# Patient Record
Sex: MALE | Race: OTHER | HISPANIC OR LATINO | URBAN - METROPOLITAN AREA
[De-identification: names, ages, dates, MRNs, and addresses within clinical notes are randomized per-mention and may not be internally consistent; named-entity substitution may affect disease eponyms.]

---

## 2019-08-29 ENCOUNTER — INPATIENT (INPATIENT)
Facility: HOSPITAL | Age: 74
LOS: 1 days | Discharge: HOME | DRG: 247 | End: 2019-08-31
Attending: FAMILY MEDICINE | Admitting: FAMILY MEDICINE
Payer: MEDICARE

## 2019-08-29 VITALS
DIASTOLIC BLOOD PRESSURE: 69 MMHG | RESPIRATION RATE: 20 BRPM | HEIGHT: 66 IN | WEIGHT: 160.06 LBS | SYSTOLIC BLOOD PRESSURE: 183 MMHG | TEMPERATURE: 98 F | HEART RATE: 67 BPM

## 2019-08-29 DIAGNOSIS — I21.4 NON-ST ELEVATION (NSTEMI) MYOCARDIAL INFARCTION: ICD-10-CM

## 2019-08-29 LAB
ALBUMIN SERPL ELPH-MCNC: 4 G/DL — SIGNIFICANT CHANGE UP (ref 3.3–5.2)
ALP SERPL-CCNC: 106 U/L — SIGNIFICANT CHANGE UP (ref 40–120)
ALT FLD-CCNC: 28 U/L — SIGNIFICANT CHANGE UP
ANION GAP SERPL CALC-SCNC: 15 MMOL/L — SIGNIFICANT CHANGE UP (ref 5–17)
APTT BLD: 30.7 SEC — SIGNIFICANT CHANGE UP (ref 27.5–36.3)
AST SERPL-CCNC: 38 U/L — SIGNIFICANT CHANGE UP
BASOPHILS # BLD AUTO: 0.07 K/UL — SIGNIFICANT CHANGE UP (ref 0–0.2)
BASOPHILS NFR BLD AUTO: 0.8 % — SIGNIFICANT CHANGE UP (ref 0–2)
BILIRUB SERPL-MCNC: 0.4 MG/DL — SIGNIFICANT CHANGE UP (ref 0.4–2)
BUN SERPL-MCNC: 19 MG/DL — SIGNIFICANT CHANGE UP (ref 8–20)
CALCIUM SERPL-MCNC: 9.5 MG/DL — SIGNIFICANT CHANGE UP (ref 8.6–10.2)
CHLORIDE SERPL-SCNC: 102 MMOL/L — SIGNIFICANT CHANGE UP (ref 98–107)
CO2 SERPL-SCNC: 23 MMOL/L — SIGNIFICANT CHANGE UP (ref 22–29)
CREAT SERPL-MCNC: 1.01 MG/DL — SIGNIFICANT CHANGE UP (ref 0.5–1.3)
EOSINOPHIL # BLD AUTO: 0.15 K/UL — SIGNIFICANT CHANGE UP (ref 0–0.5)
EOSINOPHIL NFR BLD AUTO: 1.8 % — SIGNIFICANT CHANGE UP (ref 0–6)
GLUCOSE BLDC GLUCOMTR-MCNC: 127 MG/DL — HIGH (ref 70–99)
GLUCOSE SERPL-MCNC: 154 MG/DL — HIGH (ref 70–115)
HCT VFR BLD CALC: 42.7 % — SIGNIFICANT CHANGE UP (ref 39–50)
HGB BLD-MCNC: 14.4 G/DL — SIGNIFICANT CHANGE UP (ref 13–17)
IMM GRANULOCYTES NFR BLD AUTO: 0.2 % — SIGNIFICANT CHANGE UP (ref 0–1.5)
INR BLD: 1 RATIO — SIGNIFICANT CHANGE UP (ref 0.88–1.16)
LYMPHOCYTES # BLD AUTO: 0.96 K/UL — LOW (ref 1–3.3)
LYMPHOCYTES # BLD AUTO: 11.3 % — LOW (ref 13–44)
MAGNESIUM SERPL-MCNC: 2.1 MG/DL — SIGNIFICANT CHANGE UP (ref 1.6–2.6)
MCHC RBC-ENTMCNC: 29.6 PG — SIGNIFICANT CHANGE UP (ref 27–34)
MCHC RBC-ENTMCNC: 33.7 GM/DL — SIGNIFICANT CHANGE UP (ref 32–36)
MCV RBC AUTO: 87.7 FL — SIGNIFICANT CHANGE UP (ref 80–100)
MONOCYTES # BLD AUTO: 0.79 K/UL — SIGNIFICANT CHANGE UP (ref 0–0.9)
MONOCYTES NFR BLD AUTO: 9.3 % — SIGNIFICANT CHANGE UP (ref 2–14)
NEUTROPHILS # BLD AUTO: 6.49 K/UL — SIGNIFICANT CHANGE UP (ref 1.8–7.4)
NEUTROPHILS NFR BLD AUTO: 76.6 % — SIGNIFICANT CHANGE UP (ref 43–77)
NT-PROBNP SERPL-SCNC: 166 PG/ML — SIGNIFICANT CHANGE UP (ref 0–300)
PLATELET # BLD AUTO: 225 K/UL — SIGNIFICANT CHANGE UP (ref 150–400)
POTASSIUM SERPL-MCNC: 3.6 MMOL/L — SIGNIFICANT CHANGE UP (ref 3.5–5.3)
POTASSIUM SERPL-SCNC: 3.6 MMOL/L — SIGNIFICANT CHANGE UP (ref 3.5–5.3)
PROT SERPL-MCNC: 7.4 G/DL — SIGNIFICANT CHANGE UP (ref 6.6–8.7)
PROTHROM AB SERPL-ACNC: 11.5 SEC — SIGNIFICANT CHANGE UP (ref 10–12.9)
RBC # BLD: 4.87 M/UL — SIGNIFICANT CHANGE UP (ref 4.2–5.8)
RBC # FLD: 13.7 % — SIGNIFICANT CHANGE UP (ref 10.3–14.5)
SODIUM SERPL-SCNC: 140 MMOL/L — SIGNIFICANT CHANGE UP (ref 135–145)
TROPONIN T SERPL-MCNC: 0.32 NG/ML — HIGH (ref 0–0.06)
WBC # BLD: 8.48 K/UL — SIGNIFICANT CHANGE UP (ref 3.8–10.5)
WBC # FLD AUTO: 8.48 K/UL — SIGNIFICANT CHANGE UP (ref 3.8–10.5)

## 2019-08-29 PROCEDURE — 71045 X-RAY EXAM CHEST 1 VIEW: CPT | Mod: 26

## 2019-08-29 PROCEDURE — 93010 ELECTROCARDIOGRAM REPORT: CPT | Mod: 76

## 2019-08-29 PROCEDURE — 99291 CRITICAL CARE FIRST HOUR: CPT | Mod: GC

## 2019-08-29 RX ORDER — NITROGLYCERIN 6.5 MG
0.4 CAPSULE, EXTENDED RELEASE ORAL
Refills: 0 | Status: DISCONTINUED | OUTPATIENT
Start: 2019-08-29 | End: 2019-08-31

## 2019-08-29 RX ORDER — METOPROLOL TARTRATE 50 MG
1 TABLET ORAL
Qty: 0 | Refills: 0 | DISCHARGE

## 2019-08-29 RX ORDER — DEXTROSE 50 % IN WATER 50 %
25 SYRINGE (ML) INTRAVENOUS ONCE
Refills: 0 | Status: DISCONTINUED | OUTPATIENT
Start: 2019-08-29 | End: 2019-08-31

## 2019-08-29 RX ORDER — SODIUM CHLORIDE 9 MG/ML
1000 INJECTION, SOLUTION INTRAVENOUS
Refills: 0 | Status: DISCONTINUED | OUTPATIENT
Start: 2019-08-29 | End: 2019-08-31

## 2019-08-29 RX ORDER — METOPROLOL TARTRATE 50 MG
25 TABLET ORAL EVERY 12 HOURS
Refills: 0 | Status: DISCONTINUED | OUTPATIENT
Start: 2019-08-29 | End: 2019-08-29

## 2019-08-29 RX ORDER — FAMOTIDINE 10 MG/ML
20 INJECTION INTRAVENOUS
Refills: 0 | Status: DISCONTINUED | OUTPATIENT
Start: 2019-08-29 | End: 2019-08-31

## 2019-08-29 RX ORDER — SPIRONOLACTONE 25 MG/1
25 TABLET, FILM COATED ORAL DAILY
Refills: 0 | Status: DISCONTINUED | OUTPATIENT
Start: 2019-08-29 | End: 2019-08-31

## 2019-08-29 RX ORDER — ZOLPIDEM TARTRATE 10 MG/1
5 TABLET ORAL AT BEDTIME
Refills: 0 | Status: DISCONTINUED | OUTPATIENT
Start: 2019-08-29 | End: 2019-08-31

## 2019-08-29 RX ORDER — ACETAMINOPHEN 500 MG
650 TABLET ORAL EVERY 6 HOURS
Refills: 0 | Status: DISCONTINUED | OUTPATIENT
Start: 2019-08-29 | End: 2019-08-31

## 2019-08-29 RX ORDER — ATORVASTATIN CALCIUM 80 MG/1
80 TABLET, FILM COATED ORAL AT BEDTIME
Refills: 0 | Status: DISCONTINUED | OUTPATIENT
Start: 2019-08-29 | End: 2019-08-31

## 2019-08-29 RX ORDER — ASPIRIN/CALCIUM CARB/MAGNESIUM 324 MG
325 TABLET ORAL DAILY
Refills: 0 | Status: DISCONTINUED | OUTPATIENT
Start: 2019-08-29 | End: 2019-08-30

## 2019-08-29 RX ORDER — LOSARTAN POTASSIUM 100 MG/1
25 TABLET, FILM COATED ORAL DAILY
Refills: 0 | Status: DISCONTINUED | OUTPATIENT
Start: 2019-08-29 | End: 2019-08-29

## 2019-08-29 RX ORDER — LOSARTAN POTASSIUM 100 MG/1
50 TABLET, FILM COATED ORAL DAILY
Refills: 0 | Status: DISCONTINUED | OUTPATIENT
Start: 2019-08-29 | End: 2019-08-31

## 2019-08-29 RX ORDER — MORPHINE SULFATE 50 MG/1
2 CAPSULE, EXTENDED RELEASE ORAL ONCE
Refills: 0 | Status: DISCONTINUED | OUTPATIENT
Start: 2019-08-29 | End: 2019-08-29

## 2019-08-29 RX ORDER — TICAGRELOR 90 MG/1
90 TABLET ORAL EVERY 12 HOURS
Refills: 0 | Status: DISCONTINUED | OUTPATIENT
Start: 2019-08-30 | End: 2019-08-31

## 2019-08-29 RX ORDER — DEXTROSE 50 % IN WATER 50 %
12.5 SYRINGE (ML) INTRAVENOUS ONCE
Refills: 0 | Status: DISCONTINUED | OUTPATIENT
Start: 2019-08-29 | End: 2019-08-31

## 2019-08-29 RX ORDER — METFORMIN HYDROCHLORIDE 850 MG/1
1 TABLET ORAL
Qty: 0 | Refills: 0 | DISCHARGE

## 2019-08-29 RX ORDER — RANITIDINE HYDROCHLORIDE 150 MG/1
1 TABLET, FILM COATED ORAL
Qty: 0 | Refills: 0 | DISCHARGE

## 2019-08-29 RX ORDER — POTASSIUM CHLORIDE 20 MEQ
40 PACKET (EA) ORAL ONCE
Refills: 0 | Status: COMPLETED | OUTPATIENT
Start: 2019-08-29 | End: 2019-08-29

## 2019-08-29 RX ORDER — INSULIN LISPRO 100/ML
VIAL (ML) SUBCUTANEOUS
Refills: 0 | Status: DISCONTINUED | OUTPATIENT
Start: 2019-08-29 | End: 2019-08-31

## 2019-08-29 RX ORDER — DEXTROSE 50 % IN WATER 50 %
15 SYRINGE (ML) INTRAVENOUS ONCE
Refills: 0 | Status: DISCONTINUED | OUTPATIENT
Start: 2019-08-29 | End: 2019-08-31

## 2019-08-29 RX ORDER — GLUCAGON INJECTION, SOLUTION 0.5 MG/.1ML
1 INJECTION, SOLUTION SUBCUTANEOUS ONCE
Refills: 0 | Status: DISCONTINUED | OUTPATIENT
Start: 2019-08-29 | End: 2019-08-31

## 2019-08-29 RX ORDER — NITROGLYCERIN 6.5 MG
0.5 CAPSULE, EXTENDED RELEASE ORAL EVERY 6 HOURS
Refills: 0 | Status: DISCONTINUED | OUTPATIENT
Start: 2019-08-29 | End: 2019-08-30

## 2019-08-29 RX ORDER — HYDROCHLOROTHIAZIDE 25 MG
25 TABLET ORAL DAILY
Refills: 0 | Status: DISCONTINUED | OUTPATIENT
Start: 2019-08-29 | End: 2019-08-29

## 2019-08-29 RX ORDER — METOPROLOL TARTRATE 50 MG
25 TABLET ORAL DAILY
Refills: 0 | Status: DISCONTINUED | OUTPATIENT
Start: 2019-08-29 | End: 2019-08-31

## 2019-08-29 RX ADMIN — ATORVASTATIN CALCIUM 80 MILLIGRAM(S): 80 TABLET, FILM COATED ORAL at 22:06

## 2019-08-29 RX ADMIN — Medication 650 MILLIGRAM(S): at 22:48

## 2019-08-29 RX ADMIN — MORPHINE SULFATE 2 MILLIGRAM(S): 50 CAPSULE, EXTENDED RELEASE ORAL at 23:00

## 2019-08-29 RX ADMIN — Medication 650 MILLIGRAM(S): at 23:00

## 2019-08-29 RX ADMIN — Medication 40 MILLIEQUIVALENT(S): at 19:15

## 2019-08-29 RX ADMIN — Medication 0.5 INCH(S): at 22:45

## 2019-08-29 RX ADMIN — FAMOTIDINE 20 MILLIGRAM(S): 10 INJECTION INTRAVENOUS at 22:07

## 2019-08-29 RX ADMIN — MORPHINE SULFATE 2 MILLIGRAM(S): 50 CAPSULE, EXTENDED RELEASE ORAL at 22:45

## 2019-08-29 NOTE — ED ADULT NURSE REASSESSMENT NOTE - NS ED NURSE REASSESS COMMENT FT1
as per cardiology team, pt will not be taken to cath lab for immediate cath at this time. awaiting troponin result for plan of care. hospitalist called and awaiting evaluation.

## 2019-08-29 NOTE — ED PROVIDER NOTE - ATTENDING CONTRIBUTION TO CARE
I performed a face to face history and physical exam of the patient and discussed their management with the resident/ACP. I reviewed the resident/ACP's note and agree with the documented findings and plan of care.    Pt states that this afternoon approx. 3.5 h he developed moderate dull aching L-sided chest pain without radiation. Pt went to his pcp and was sent to the ER for further evaluation. PT states that he received 4 baby ASA and has significantly improved since then.    physical - rrr. ctab. abd - soft, nt. no edema. no rash.    plan - labs and imaging reviewed. code stemi called. Code stemi cancelled by Dr. Ortiz.  Dr. Gonzalez to admit for NSTEMI.

## 2019-08-29 NOTE — ED PROVIDER NOTE - OBJECTIVE STATEMENT
74 year-old male with history of HTN, DM2, former smoker of 60 years (quit 3 years ago) presents to the Emergency Department for chest pain.  CP started 3.5 hours ago PTA; stopped 15 minute ago.  + N/V.  No fevers, chills, diaphoresis, shortness of breath, abdominal pain, LOC.  Received 4 81mg ASA by EMS.  Does not have a cardiologist. 74 year-old male with history of HTN, DM2, former smoker of 60 years (quit 3 years ago) presents to the Emergency Department for chest pain.  CP started 3.5 hours ago PTA; improved after receiving ASA but still mildly present. + N/V.  No fevers, chills, diaphoresis, shortness of breath, abdominal pain, LOC.  Received 4 81mg ASA by EMS.  Does not have a cardiologist.

## 2019-08-29 NOTE — ED ADULT NURSE NOTE - OBJECTIVE STATEMENT
pt comes to ED with reports of sudden onset let sided chest pain with associated SOB. pt given 324 mg baby ASA by EMS en route to ED. skin warm dry and intact, even and unlabored resps present.

## 2019-08-29 NOTE — ED ADULT NURSE NOTE - NSIMPLEMENTINTERV_GEN_ALL_ED
Implemented All Universal Safety Interventions:  [place] to call system. Call bell, personal items and telephone within reach. Instruct patient to call for assistance. Room bathroom lighting operational. Non-slip footwear when patient is off stretcher. Physically safe environment: no spills, clutter or unnecessary equipment. Stretcher in lowest position, wheels locked, appropriate side rails in place.

## 2019-08-29 NOTE — DISCHARGE NOTE PROVIDER - NSDCCPTREATMENT_GEN_ALL_CORE_FT
PRINCIPAL PROCEDURE  Procedure: Left heart cardiac cath  Findings and Treatment: -right groin precautions  -dLAD 70%  2.48u43yf CODY  -mLAD 90% 2.5x30mm CODY  -mLAD 3.0x15mm CODY  -DAPT (dual anti platelet therapy) with aspirin and brilinta  -BB/ARB/STATIN  - cardiac rehab info provided/referral and communication to cardiac rehab completed PRINCIPAL PROCEDURE  Procedure: Left heart cardiac cath  Findings and Treatment: -right groin precautions; angioseal  -dLAD 70%  2.86m87vk CODY  -mLAD 90% 2.5x38mm CODY  -mLAD 3.0x15mm CODY  -DAPT (dual anti platelet therapy) with aspirin and brilinta  -BB/ARB/STATIN  - cardiac rehab info provided/referral and communication to cardiac rehab completed

## 2019-08-29 NOTE — ED ADULT NURSE NOTE - CHIEF COMPLAINT QUOTE
pt c/o left side chest pain that radiates to back  started about 2hours ago pt took 324 ASA @1550, resp even and unlabored no distress noted

## 2019-08-29 NOTE — DISCHARGE NOTE PROVIDER - CARE PROVIDERS DIRECT ADDRESSES
,DirectAddress_Unknown ,[email or web address]

## 2019-08-29 NOTE — PROGRESS NOTE ADULT - ASSESSMENT
A/P: 75 yo male with chest pain since morning pressure like no radiation or diaphoresis. Pt was in normal state of chente did move boxes and family noticing increase SOB with exertion lately. Seen in ER with abnormal EKG +NSTEMI now s/p LHC that revealed 90% mLAD stenosis c/w thrombus now s/p JUNO x 2 and 70% dLAD now s/p JUNO x 1.  EF measured 35%.  Post procedure patient c/o 3/10 dull chest pain without significant change in EKG. /80  -Add NTG 0.5 inch ACW every 6 hours  -Morphine 2mg IVP x 1  -check troponin in am  -EKG in am  -Discussed with Dr. Ortiz  -Continue to monitor

## 2019-08-29 NOTE — ED PROVIDER NOTE - PHYSICAL EXAMINATION
*Gen: NAD, AAO*3  *HEENT: NC/AT, MMM, airway patent, trachea midline  *CV: RRR, S1/S2 present, no murmurs/rubs/gallops  *Resp: no respiratory distress, LCTAB, no wheezing/rales/rhonchi  *Abd: non-distended, soft N/Tx4, no guarding or rigidity  *Neuro: no focal neuro deficits, moving all limbs appropriately  *Extremities: no gross deformity  *Skin: no rashes, no wounds   ~ Aniyah Ayala M.D.

## 2019-08-29 NOTE — ED PROVIDER NOTE - CLINICAL SUMMARY MEDICAL DECISION MAKING FREE TEXT BOX
74 year-old male with history of HTN, DM2, former smoker of 60 years (quit 3 years ago) presents to the Emergency Department for chest pain; EKG concernig for ACS; code STEMI called.  DDx also includes PNA. Pt with chest pain and abnormal EKG.  code STEMI called.  Dr. Ortiz reviewed EKg and feels that this is not a STEMI.  code STEMI cancelled.  pt with elevated troponin.  DR. Gonzalez to admit patient. Pt already received 324 mg of ASA.

## 2019-08-29 NOTE — ED PROVIDER NOTE - CRITICAL CARE PROVIDED
additional history taking/direct patient care (not related to procedure)/interpretation of diagnostic studies/documentation/consultation with other physicians/consult w/ pt's family directly relating to pts condition

## 2019-08-29 NOTE — CHART NOTE - NSCHARTNOTEFT_GEN_A_CORE
73 y/o obese,  M with a PMHx of HTN, HLD; came in through the ED with ECG concerning for STEMI and c/o chest pain    Pt assessed  denies chest pain, SOB, N/V, diaphoresis      Reports he had one episode of emesis prior to arrival  He rec'd baby asa x4 tabs  ECG revealed junctional tachycardia rate 95bpm    D/w Dr. Christianson (on-call STEMI MD) and ER attending    plan:  control rate with beta blockers  trend troponins  CXR to be done; r/o heart failure exacerbation  proBNP    Labs pending  Cancel CODE STEMI per DR. CHRISTIANSON 73 y/o obese,  M, former smoker, with a PMHx of HTN, HLD, DM II; came in through the ED with ECG concerning for STEMI and c/o chest pain    Pt assessed  denies chest pain, SOB, diaphoresis  Reports he had one episode of emesis prior to arrival  He rec'd baby asa x4 tabs  ECG revealed junctional tachycardia rate 95bpm    D/w Dr. Christianson (on-call STEMI MD) and ER attending    plan:  control rate with beta blockers  trend troponins  CXR to be done; r/o heart failure exacerbation  proBNP    Labs pending  Cancel CODE STEMI per DR. CHRISTIANSON

## 2019-08-29 NOTE — H&P ADULT - NSHPPHYSICALEXAM_GEN_ALL_CORE
Vital Signs Last 24 Hrs  T(C): 36.7 (29 Aug 2019 18:30), Max: 36.7 (29 Aug 2019 18:30)  T(F): 98.1 (29 Aug 2019 18:30), Max: 98.1 (29 Aug 2019 18:30)  HR: 61 (29 Aug 2019 21:30) (61 - 84)  BP: 149/79 (29 Aug 2019 21:30) (135/72 - 183/69)  BP(mean): --  RR: 16 (29 Aug 2019 21:30) (16 - 20)  SpO2: 97% (29 Aug 2019 18:30) (97% - 97%)  HEENT: PEARLA  Neck: Supple  Cardio: S1 S2 No Murmur  Pulm: CTA No Rales or Ronchi  Abd: Soft NT ND BS+  Rectal - refused  Ext: No DCT  Skin: No Rash  Neuro: Awake Pleasant

## 2019-08-29 NOTE — H&P ADULT - HISTORY OF PRESENT ILLNESS
75 yo male with chest pain since morning pressure like no radiation or diaphoresis. Pt was in normal state of chente did move boxes and family noticing increase SOB with exertion lately. See in ER I asked Dr Ortiz to see secondary to Abn EKG and +Tropinins.

## 2019-08-29 NOTE — CHART NOTE - NSCHARTNOTEFT_GEN_A_CORE
Spoke to Dr. Ortiz  Troponin's 0.32  Dr. Ortiz d/w Dr. Gonzalez   Pt to come to cath lab for urgent cath with Dr. Ortiz  Risk factors include: tobacco use, HTN, HLD, DM

## 2019-08-29 NOTE — DISCHARGE NOTE PROVIDER - HOSPITAL COURSE
74 you with Chest pain +troponins brought to Cath lab found to have acute thrombus with multiple stents placed. Tolerated procedure well no complications EF-40% mild Aortic stenosis.

## 2019-08-29 NOTE — PROGRESS NOTE ADULT - SUBJECTIVE AND OBJECTIVE BOX
Cardiology NP note:     -Called to patient bedside for c/o chest pain 3/10; s/p PCI    EKG: NSR 68 bpm anterolateral ST changes similar to previous EKG  TELE: NSR 60s    MEDICATIONS  (STANDING):  aspirin 325 milliGRAM(s) Oral daily  atorvastatin 80 milliGRAM(s) Oral at bedtime  dextrose 5%. 1000 milliLiter(s) (50 mL/Hr) IV Continuous <Continuous>  dextrose 50% Injectable 12.5 Gram(s) IV Push once  dextrose 50% Injectable 25 Gram(s) IV Push once  dextrose 50% Injectable 25 Gram(s) IV Push once  famotidine    Tablet 20 milliGRAM(s) Oral two times a day  insulin lispro (HumaLOG) corrective regimen sliding scale   SubCutaneous three times a day before meals  losartan 50 milliGRAM(s) Oral daily  metoprolol succinate ER 25 milliGRAM(s) Oral daily  morphine  - Injectable 2 milliGRAM(s) IV Push once  nitroglycerin    2% Ointment 0.5 Inch(s) Transdermal every 6 hours  spironolactone 25 milliGRAM(s) Oral daily    MEDICATIONS  (PRN):  acetaminophen   Tablet .. 650 milliGRAM(s) Oral every 6 hours PRN Mild Pain (1 - 3)  dextrose 40% Gel 15 Gram(s) Oral once PRN Blood Glucose LESS THAN 70 milliGRAM(s)/deciliter  glucagon  Injectable 1 milliGRAM(s) IntraMuscular once PRN Glucose LESS THAN 70 milligrams/deciliter  nitroglycerin     SubLingual 0.4 milliGRAM(s) SubLingual every 5 minutes PRN Chest Pain  zolpidem 5 milliGRAM(s) Oral at bedtime PRN Insomnia      Allergies:  No Known Allergies      PAST MEDICAL & SURGICAL HISTORY:  DM2 (diabetes mellitus, type 2)  HTN (hypertension)  No significant past surgical history      Vital Signs Last 24 Hrs  T(C): 36.7 (29 Aug 2019 18:30), Max: 36.7 (29 Aug 2019 18:30)  T(F): 98.1 (29 Aug 2019 18:30), Max: 98.1 (29 Aug 2019 18:30)  HR: 64 (29 Aug 2019 23:00) (61 - 84)  BP: 159/81 (29 Aug 2019 23:00) (135/72 - 183/69)  BP(mean): --  RR: 14 (29 Aug 2019 23:00) (14 - 20)  SpO2: 97% (29 Aug 2019 18:30) (97% - 97%)    Physical Exam:  Constitutional: NAD, AAOx3  Cardiovascular: +S1S2 RRR  Pulmonary: CTA b/l, unlabored  GI: soft NTND +BS  Extremities: no pedal edema, +distal pulses b/l  Neuro: non focal, GABRIEL x4    LABS:                        14.4   8.48  )-----------( 225      ( 29 Aug 2019 17:01 )             42.7     08-29    140  |  102  |  19.0  ----------------------------<  154<H>  3.6   |  23.0  |  1.01    Ca    9.5      29 Aug 2019 17:01  Mg     2.1     08-29    TPro  7.4  /  Alb  4.0  /  TBili  0.4  /  DBili  x   /  AST  38  /  ALT  28  /  AlkPhos  106  08-29    PT/INR - ( 29 Aug 2019 17:01 )   PT: 11.5 sec;   INR: 1.00 ratio         PTT - ( 29 Aug 2019 17:01 )  PTT:30.7 sec      RADIOLOGY & ADDITIONAL TESTS:  < from: Cardiac Cath Lab - Adult (08.29.19 @ 19:14) >  VENTRICLES: Analysis of regional contractile function demonstrated severe  anterolateral hypokinesis, apical akinesis, and severe diaphragmatic  hypokinesis. EF estimated was 35 %.  CORONARY VESSELS: The coronary circulation is left dominant.  LM:   --  LM: Angiography showed minor luminal irregularities with no flow  limiting lesions.  LAD:   --  Mid LAD: There was a diffuse 90 % stenosis. The lesion was  associated with a small filling defect consistent with thrombus. There was  WILLIAM grade 3 flow through the vessel (brisk flow). This lesion is a likely  culprit for the patient's recent myocardial infarction. An intervention  was performed.  --  Distal LAD: There was a 70 % stenosis. There was WILLIAM grade 3 flow  through the vessel (brisk flow). An intervention was performed.  CX:   --  LPDA: There was a discrete 60 % stenosis.  RI:   --  Ramus intermedius: Angiography showed minor luminal  irregularities with no flow limiting lesions.  RCA:   --  RCA: Angiography showed minor luminal irregularities with no  flow limiting lesions.  COMPLICATIONS: No complications occurred during the cath lab visit. No  complications occurred during the cath lab visit.  DIAGNOSTIC RECOMMENDATIONS: Continue metoprolol (Lopressor, Toprol). Add  spironolactone (Aldactone). Continue losartan (Cozaar). Add aspirin. Add  brilinta Add atorvastatin. The patient should continue with the present  medications.  INTERVENTIONAL RECOMMENDATIONS: Continue metoprolol (Lopressor, Toprol).  Add spironolactone (Aldactone). Continue losartan (Cozaar). Add aspirin.  Add brilinta Add atorvastatin.  DISPOSITION: The patient left the catheterization laboratory in guarded  condition.  Prepared and signed by  Albin Ortiz MD  Signed 08/29/2019 20:21:33    < end of copied text >

## 2019-08-29 NOTE — ED ADULT NURSE REASSESSMENT NOTE - NS ED NURSE REASSESS COMMENT FT1
pt to be taken to cath lab at this time. even and unlabored resps present, VSS, NSr on monitor. report given to cath lab.

## 2019-08-29 NOTE — PROGRESS NOTE ADULT - SUBJECTIVE AND OBJECTIVE BOX
Department of Cardiology                                                                  [facility]/[place] [address] [address] [facility]                                                            Telephone: [phone]. Fax:[phone]                                                                                 Cardiac Cath NP Note       Narrative:  74y  Male is now s/p left heart catheterization via right groin approach (angioseal, no site complications) with Dr. Ortiz:  -NSTEMI  -right groin precautions  -dLAD 70%  2.09j32rt CODY  -mLAD 90% 2.5x30mm CODY  -mLAD 3.0x15mm CODY  -DAPT (dual anti platelet therapy) with aspirin and brilinta  -BB/ARB/STATIN  -heparin used 8,000 units  -brilinta loaded in lab 180mg  -contrast used 133ml  -IVF 50cc  -EBL 10cc      PAST MEDICAL & SURGICAL HISTORY:  DM2 (diabetes mellitus, type 2)  HTN (hypertension)  No significant past surgical history      Home Medications:  hydroCHLOROthiazide 25 mg oral tablet: 1 tab(s) orally once a day (29 Aug 2019 19:12)  losartan 25 mg oral tablet: 1 tab(s) orally once a day (29 Aug 2019 19:12)  metFORMIN 500 mg oral tablet: 1 tab(s) orally 2 times a day (29 Aug 2019 19:12)  Metoprolol Succinate ER 25 mg oral tablet, extended release: 1 tab(s) orally once a day (29 Aug 2019 19:12)  raNITIdine 150 mg oral capsule: 1 cap(s) orally once a day (29 Aug 2019 19:12)      General: No fatigue, no fevers/chills  Respiratory: No dyspnea, no cough, no wheeze  CV: No chest pain, no palpitations  Abd: No nausea  Neuro: No headache, no dizziness  No Known Allergies      Objective:  Vital Signs Last 24 Hrs  T(C): 36.7 (29 Aug 2019 18:30), Max: 36.7 (29 Aug 2019 18:30)  T(F): 98.1 (29 Aug 2019 18:30), Max: 98.1 (29 Aug 2019 18:30)  HR: 76 (29 Aug 2019 18:30) (67 - 84)  BP: 175/82 (29 Aug 2019 18:30) (162/87 - 183/69)  BP(mean): --  RR: 16 (29 Aug 2019 18:30) (16 - 20)  SpO2: 97% (29 Aug 2019 18:30) (97% - 97%)    CM: SR  Neuro: A&OX3, CN 2-12 intact  HEENT: NC, AT  Lungs: CTA B/L  CV: S1, S2, no murmur, RRR  Abd: Soft  Right Groin: Soft, no bleeding, no hematoma  Extremity: + distal pulses                          14.4   8.48  )-----------( 225      ( 29 Aug 2019 17:01 )             42.7     08-29    140  |  102  |  19.0  ----------------------------<  154<H>  3.6   |  23.0  |  1.01    Ca    9.5      29 Aug 2019 17:01  Mg     2.1     08-29    TPro  7.4  /  Alb  4.0  /  TBili  0.4  /  DBili  x   /  AST  38  /  ALT  28  /  AlkPhos  106  08-29    PT/INR - ( 29 Aug 2019 17:01 )   PT: 11.5 sec;   INR: 1.00 ratio         PTT - ( 29 Aug 2019 17:01 )  PTT:30.7 sec    -post cardiac cath orders  -radial or groin precautions  -continue current medical therapy  -DAPT (ASA and brilinta)  -BB/ARB/HCTZ  -ECG  -labs and ECG in am  -admit due to NSTEMI; trend trops, LAD long lesion Department of Cardiology                                                                  [facility]/[place] [address] [address] [facility]                                                            Telephone: [phone]. Fax:[phone]                                                                                 Cardiac Cath NP Note       Narrative:  74y  Male is now s/p left heart catheterization via right groin approach (angioseal, no site complications) with Dr. Ortiz:  -NSTEMI  -right groin precautions  -dLAD 70%  2.29p36dp CODY  -mLAD 90% 2.5x30mm CODY  -mLAD 3.0x15mm CODY  -EF 35%  -DAPT (dual anti platelet therapy) with aspirin and brilinta  -BB/ARB/STATIN  -heparin used 11,000 units  -brilinta loaded in lab 180mg  -contrast used 133ml  -IVF 50cc  -EBL 10cc      PAST MEDICAL & SURGICAL HISTORY:  DM2 (diabetes mellitus, type 2)  HTN (hypertension)  No significant past surgical history      Home Medications:  hydroCHLOROthiazide 25 mg oral tablet: 1 tab(s) orally once a day (29 Aug 2019 19:12)  losartan 25 mg oral tablet: 1 tab(s) orally once a day (29 Aug 2019 19:12)  metFORMIN 500 mg oral tablet: 1 tab(s) orally 2 times a day (29 Aug 2019 19:12)  Metoprolol Succinate ER 25 mg oral tablet, extended release: 1 tab(s) orally once a day (29 Aug 2019 19:12)  raNITIdine 150 mg oral capsule: 1 cap(s) orally once a day (29 Aug 2019 19:12)      General: No fatigue, no fevers/chills  Respiratory: No dyspnea, no cough, no wheeze  CV: No chest pain, no palpitations  Abd: No nausea  Neuro: No headache, no dizziness  No Known Allergies      Objective:  Vital Signs Last 24 Hrs  T(C): 36.7 (29 Aug 2019 18:30), Max: 36.7 (29 Aug 2019 18:30)  T(F): 98.1 (29 Aug 2019 18:30), Max: 98.1 (29 Aug 2019 18:30)  HR: 76 (29 Aug 2019 18:30) (67 - 84)  BP: 175/82 (29 Aug 2019 18:30) (162/87 - 183/69)  BP(mean): --  RR: 16 (29 Aug 2019 18:30) (16 - 20)  SpO2: 97% (29 Aug 2019 18:30) (97% - 97%)    CM: SR  Neuro: A&OX3, CN 2-12 intact  HEENT: NC, AT  Lungs: CTA B/L  CV: S1, S2, no murmur, RRR  Abd: Soft  Right Groin: Soft, no bleeding, no hematoma  Extremity: + distal pulses                          14.4   8.48  )-----------( 225      ( 29 Aug 2019 17:01 )             42.7     08-29    140  |  102  |  19.0  ----------------------------<  154<H>  3.6   |  23.0  |  1.01    Ca    9.5      29 Aug 2019 17:01  Mg     2.1     08-29    TPro  7.4  /  Alb  4.0  /  TBili  0.4  /  DBili  x   /  AST  38  /  ALT  28  /  AlkPhos  106  08-29    PT/INR - ( 29 Aug 2019 17:01 )   PT: 11.5 sec;   INR: 1.00 ratio         PTT - ( 29 Aug 2019 17:01 )  PTT:30.7 sec    -post cardiac cath orders  -radial or groin precautions  -continue current medical therapy  -DAPT (ASA and brilinta)  -BB/ARB/HCTZ  -ECG  -labs and ECG in am  -admit due to NSTEMI; trend trops, LAD long lesion Department of Cardiology                                                                  [facility]/[place] [address] [address] [facility]                                                            Telephone: [phone]. Fax:[phone]                                                                                 Cardiac Cath NP Note       Narrative:  74y  Male is now s/p left heart catheterization via right groin approach (angioseal, no site complications) with Dr. Ortiz:  -NSTEMI  -right groin precautions  -dLAD 70%  2.93f32ne CODY  -mLAD 90% 2.5x38mm CODY  -mLAD 3.0x15mm CODY  -EF 35%  -DAPT (dual anti platelet therapy) with aspirin and brilinta  -BB/ARB/STATIN  -heparin used 11,000 units  -brilinta loaded in lab 180mg  -contrast used 133ml  -IVF 50cc  -EBL 10cc      PAST MEDICAL & SURGICAL HISTORY:  DM2 (diabetes mellitus, type 2)  HTN (hypertension)  No significant past surgical history      Home Medications:  hydroCHLOROthiazide 25 mg oral tablet: 1 tab(s) orally once a day (29 Aug 2019 19:12)  losartan 25 mg oral tablet: 1 tab(s) orally once a day (29 Aug 2019 19:12)  metFORMIN 500 mg oral tablet: 1 tab(s) orally 2 times a day (29 Aug 2019 19:12)  Metoprolol Succinate ER 25 mg oral tablet, extended release: 1 tab(s) orally once a day (29 Aug 2019 19:12)  raNITIdine 150 mg oral capsule: 1 cap(s) orally once a day (29 Aug 2019 19:12)      General: No fatigue, no fevers/chills  Respiratory: No dyspnea, no cough, no wheeze  CV: No chest pain, no palpitations  Abd: No nausea  Neuro: No headache, no dizziness  No Known Allergies      Objective:  Vital Signs Last 24 Hrs  T(C): 36.7 (29 Aug 2019 18:30), Max: 36.7 (29 Aug 2019 18:30)  T(F): 98.1 (29 Aug 2019 18:30), Max: 98.1 (29 Aug 2019 18:30)  HR: 76 (29 Aug 2019 18:30) (67 - 84)  BP: 175/82 (29 Aug 2019 18:30) (162/87 - 183/69)  BP(mean): --  RR: 16 (29 Aug 2019 18:30) (16 - 20)  SpO2: 97% (29 Aug 2019 18:30) (97% - 97%)    CM: SR  Neuro: A&OX3, CN 2-12 intact  HEENT: NC, AT  Lungs: CTA B/L  CV: S1, S2, no murmur, RRR  Abd: Soft  Right Groin: Soft, no bleeding, no hematoma, +angioseal  Extremity: + distal pulses                          14.4   8.48  )-----------( 225      ( 29 Aug 2019 17:01 )             42.7     08-29    140  |  102  |  19.0  ----------------------------<  154<H>  3.6   |  23.0  |  1.01    Ca    9.5      29 Aug 2019 17:01  Mg     2.1     08-29    TPro  7.4  /  Alb  4.0  /  TBili  0.4  /  DBili  x   /  AST  38  /  ALT  28  /  AlkPhos  106  08-29    PT/INR - ( 29 Aug 2019 17:01 )   PT: 11.5 sec;   INR: 1.00 ratio         PTT - ( 29 Aug 2019 17:01 )  PTT:30.7 sec    -post cardiac cath orders  -right groin precautions  -continue current medical therapy  -DAPT (ASA and brilinta)  -BB/ARB/aldactone  -ECG  -labs and ECG in am  -admit due to NSTEMI; trend trops, LAD long lesion  -Follow up with [facility] per Dr. Ortiz; pt lives in [place]

## 2019-08-29 NOTE — ED ADULT NURSE NOTE - NSFALLRSKASSESASSIST_ED_ALL_ED
no Area H Indication Text: Tumors in this location are included in Area H (eyelids, eyebrows, nose, lips, chin, ear, pre-auricular, post-auricular, temple, genitalia, hands, feet, ankles and areola).  Tissue conservation is critical in these anatomic locations.

## 2019-08-29 NOTE — DISCHARGE NOTE PROVIDER - CARE PROVIDER_API CALL
Albin Ortiz (MD)  Cardiovascular Disease; Interventional Cardiology  [address], [address]  [place] [address]  Phone: [phone]  Fax: [phone]  Follow Up Time: Maurizio White)  Cardiovascular Disease; Interventional Cardiology  [address] [facility], [address] [place] [address]  Phone: [phone]  Fax: [phone]  Follow Up Time: Albin Ortiz (MD)  Cardiovascular Disease; Interventional Cardiology  [address], [address]  [place] [address]  Phone: [phone]  Fax: [phone]  Follow Up Time:

## 2019-08-29 NOTE — ED PROVIDER NOTE - PRINCIPAL DIAGNOSIS
BLADDER INFECTION, Female (Adult)    A bladder infection (\"cystitis\" or \"UTI\") usually causes a constant urge to urinate and a burning when passing urine. Urine may be cloudy, smelly or dark. There may be pain in the lower abdomen. A bladder infection occurs when bacteria from the vaginal area enter the bladder opening (urethra). This can occur from sexual intercourse, wearing tight clothing, dehydration and other factors.  HOME CARE:  · Drink lots of fluids (at least 6-8 glasses a day, unless you must restrict fluids for other medical reasons). This will force the medicine into your urinary system and flush the bacteria out of your body.  · Avoid sexual intercourse until your symptoms are gone.  · Avoid caffeine, alcohol and spicy foods. These can irritate the bladder.  · A bladder infection is treated with antibiotics. You may also be given Pyridium (generic = phenazopyridine) to reduce the burning sensation. This medicine will cause your urine to become a bright orange color. The orange urine may stain clothing. You may wear a pad or panty-liner to protect clothing.  PREVENTING FUTURE INFECTIONS:  · Always wipe from front to back after a bowel movement.  · Keep the genital area clean and dry.  · Drink plenty of fluids each day to avoid dehydration.  · Both sexual partners should wash before intercourse.  · Urinate right after intercourse to flush out the bladder.  · Wear cotton underwear and cotton-lined panty hose; avoid tight-fitting pants.  · If you are on birth control pills and are having frequent bladder infections, discuss with your doctor.  FOLLOW UP: Return to this facility or see your doctor if ALL symptoms are not gone after three days of treatment.  GET PROMPT MEDICAL ATTENTION if any of the following occur:  · Fever of 100.4ºF (38ºC) or higher, or as directed by your healthcare provider  · No improvement by the third day of treatment  · Increasing back or abdominal pain  · Repeated vomiting; unable  to keep medicine down  · Weakness, dizziness or fainting  · Vaginal discharge  · Pain, redness or swelling in the labia (outer vaginal area)  © 9807-3231 [employer], [address] [place] [address], [place] [address]. All rights reserved. This information is not intended as a substitute for professional medical care. Always follow your healthcare professional's instructions.     NSTEMI (non-ST elevated myocardial infarction)

## 2019-08-30 DIAGNOSIS — I21.4 NON-ST ELEVATION (NSTEMI) MYOCARDIAL INFARCTION: ICD-10-CM

## 2019-08-30 LAB
ALBUMIN SERPL ELPH-MCNC: 3.5 G/DL — SIGNIFICANT CHANGE UP (ref 3.3–5.2)
ALBUMIN SERPL ELPH-MCNC: 3.6 G/DL — SIGNIFICANT CHANGE UP (ref 3.3–5.2)
ALP SERPL-CCNC: 90 U/L — SIGNIFICANT CHANGE UP (ref 40–120)
ALP SERPL-CCNC: 90 U/L — SIGNIFICANT CHANGE UP (ref 40–120)
ALT FLD-CCNC: 32 U/L — SIGNIFICANT CHANGE UP
ALT FLD-CCNC: 32 U/L — SIGNIFICANT CHANGE UP
ANION GAP SERPL CALC-SCNC: 12 MMOL/L — SIGNIFICANT CHANGE UP (ref 5–17)
ANION GAP SERPL CALC-SCNC: 14 MMOL/L — SIGNIFICANT CHANGE UP (ref 5–17)
AST SERPL-CCNC: 105 U/L — HIGH
AST SERPL-CCNC: 95 U/L — HIGH
BILIRUB SERPL-MCNC: 0.5 MG/DL — SIGNIFICANT CHANGE UP (ref 0.4–2)
BILIRUB SERPL-MCNC: 0.6 MG/DL — SIGNIFICANT CHANGE UP (ref 0.4–2)
BUN SERPL-MCNC: 14 MG/DL — SIGNIFICANT CHANGE UP (ref 8–20)
BUN SERPL-MCNC: 15 MG/DL — SIGNIFICANT CHANGE UP (ref 8–20)
CALCIUM SERPL-MCNC: 8.9 MG/DL — SIGNIFICANT CHANGE UP (ref 8.6–10.2)
CALCIUM SERPL-MCNC: 9 MG/DL — SIGNIFICANT CHANGE UP (ref 8.6–10.2)
CHLORIDE SERPL-SCNC: 98 MMOL/L — SIGNIFICANT CHANGE UP (ref 98–107)
CHLORIDE SERPL-SCNC: 98 MMOL/L — SIGNIFICANT CHANGE UP (ref 98–107)
CHOLEST SERPL-MCNC: 133 MG/DL — SIGNIFICANT CHANGE UP (ref 110–199)
CK MB CFR SERPL CALC: 92.3 NG/ML — HIGH (ref 0–6.7)
CK SERPL-CCNC: 810 U/L — HIGH (ref 30–200)
CO2 SERPL-SCNC: 24 MMOL/L — SIGNIFICANT CHANGE UP (ref 22–29)
CO2 SERPL-SCNC: 25 MMOL/L — SIGNIFICANT CHANGE UP (ref 22–29)
CREAT SERPL-MCNC: 0.78 MG/DL — SIGNIFICANT CHANGE UP (ref 0.5–1.3)
CREAT SERPL-MCNC: 0.79 MG/DL — SIGNIFICANT CHANGE UP (ref 0.5–1.3)
GLUCOSE BLDC GLUCOMTR-MCNC: 147 MG/DL — HIGH (ref 70–99)
GLUCOSE BLDC GLUCOMTR-MCNC: 148 MG/DL — HIGH (ref 70–99)
GLUCOSE BLDC GLUCOMTR-MCNC: 171 MG/DL — HIGH (ref 70–99)
GLUCOSE BLDC GLUCOMTR-MCNC: 225 MG/DL — HIGH (ref 70–99)
GLUCOSE BLDC GLUCOMTR-MCNC: 236 MG/DL — HIGH (ref 70–99)
GLUCOSE SERPL-MCNC: 210 MG/DL — HIGH (ref 70–115)
GLUCOSE SERPL-MCNC: 247 MG/DL — HIGH (ref 70–115)
HBA1C BLD-MCNC: 6.3 % — HIGH (ref 4–5.6)
HCT VFR BLD CALC: 40.3 % — SIGNIFICANT CHANGE UP (ref 39–50)
HDLC SERPL-MCNC: 35 MG/DL — LOW
HGB BLD-MCNC: 13.3 G/DL — SIGNIFICANT CHANGE UP (ref 13–17)
LIPID PNL WITH DIRECT LDL SERPL: 69 MG/DL — SIGNIFICANT CHANGE UP
MAGNESIUM SERPL-MCNC: 2.2 MG/DL — SIGNIFICANT CHANGE UP (ref 1.6–2.6)
MCHC RBC-ENTMCNC: 28.9 PG — SIGNIFICANT CHANGE UP (ref 27–34)
MCHC RBC-ENTMCNC: 33 GM/DL — SIGNIFICANT CHANGE UP (ref 32–36)
MCV RBC AUTO: 87.4 FL — SIGNIFICANT CHANGE UP (ref 80–100)
PLATELET # BLD AUTO: 205 K/UL — SIGNIFICANT CHANGE UP (ref 150–400)
POTASSIUM SERPL-MCNC: 4 MMOL/L — SIGNIFICANT CHANGE UP (ref 3.5–5.3)
POTASSIUM SERPL-MCNC: 4.2 MMOL/L — SIGNIFICANT CHANGE UP (ref 3.5–5.3)
POTASSIUM SERPL-SCNC: 4 MMOL/L — SIGNIFICANT CHANGE UP (ref 3.5–5.3)
POTASSIUM SERPL-SCNC: 4.2 MMOL/L — SIGNIFICANT CHANGE UP (ref 3.5–5.3)
PROT SERPL-MCNC: 6.3 G/DL — LOW (ref 6.6–8.7)
PROT SERPL-MCNC: 6.5 G/DL — LOW (ref 6.6–8.7)
RBC # BLD: 4.61 M/UL — SIGNIFICANT CHANGE UP (ref 4.2–5.8)
RBC # FLD: 13.9 % — SIGNIFICANT CHANGE UP (ref 10.3–14.5)
SODIUM SERPL-SCNC: 135 MMOL/L — SIGNIFICANT CHANGE UP (ref 135–145)
SODIUM SERPL-SCNC: 136 MMOL/L — SIGNIFICANT CHANGE UP (ref 135–145)
TOTAL CHOLESTEROL/HDL RATIO MEASUREMENT: 4 RATIO — SIGNIFICANT CHANGE UP (ref 3.4–9.6)
TRIGL SERPL-MCNC: 145 MG/DL — SIGNIFICANT CHANGE UP (ref 10–200)
TROPONIN T SERPL-MCNC: 2.3 NG/ML — HIGH (ref 0–0.06)
TROPONIN T SERPL-MCNC: 2.46 NG/ML — HIGH (ref 0–0.06)
WBC # BLD: 9.74 K/UL — SIGNIFICANT CHANGE UP (ref 3.8–10.5)
WBC # FLD AUTO: 9.74 K/UL — SIGNIFICANT CHANGE UP (ref 3.8–10.5)

## 2019-08-30 PROCEDURE — 99232 SBSQ HOSP IP/OBS MODERATE 35: CPT

## 2019-08-30 PROCEDURE — 93010 ELECTROCARDIOGRAM REPORT: CPT

## 2019-08-30 RX ORDER — ASPIRIN/CALCIUM CARB/MAGNESIUM 324 MG
81 TABLET ORAL DAILY
Refills: 0 | Status: DISCONTINUED | OUTPATIENT
Start: 2019-08-30 | End: 2019-08-31

## 2019-08-30 RX ADMIN — ZOLPIDEM TARTRATE 5 MILLIGRAM(S): 10 TABLET ORAL at 23:45

## 2019-08-30 RX ADMIN — Medication 25 MILLIGRAM(S): at 05:58

## 2019-08-30 RX ADMIN — Medication 650 MILLIGRAM(S): at 22:20

## 2019-08-30 RX ADMIN — Medication 4: at 16:38

## 2019-08-30 RX ADMIN — Medication 81 MILLIGRAM(S): at 16:38

## 2019-08-30 RX ADMIN — ATORVASTATIN CALCIUM 80 MILLIGRAM(S): 80 TABLET, FILM COATED ORAL at 21:01

## 2019-08-30 RX ADMIN — Medication 0.5 INCH(S): at 05:58

## 2019-08-30 RX ADMIN — Medication 650 MILLIGRAM(S): at 21:23

## 2019-08-30 RX ADMIN — TICAGRELOR 90 MILLIGRAM(S): 90 TABLET ORAL at 05:58

## 2019-08-30 RX ADMIN — FAMOTIDINE 20 MILLIGRAM(S): 10 INJECTION INTRAVENOUS at 16:38

## 2019-08-30 RX ADMIN — LOSARTAN POTASSIUM 50 MILLIGRAM(S): 100 TABLET, FILM COATED ORAL at 05:58

## 2019-08-30 RX ADMIN — FAMOTIDINE 20 MILLIGRAM(S): 10 INJECTION INTRAVENOUS at 05:58

## 2019-08-30 RX ADMIN — SPIRONOLACTONE 25 MILLIGRAM(S): 25 TABLET, FILM COATED ORAL at 05:58

## 2019-08-30 RX ADMIN — Medication 0.5 INCH(S): at 10:40

## 2019-08-30 RX ADMIN — TICAGRELOR 90 MILLIGRAM(S): 90 TABLET ORAL at 16:38

## 2019-08-30 NOTE — PROGRESS NOTE ADULT - SUBJECTIVE AND OBJECTIVE BOX
HPI:  75 yo male with chest pain since morning pressure like no radiation or diaphoresis. Pt was in normal state of chente did move boxes and family noticing increase SOB with exertion lately. See in ER I asked Dr Ortiz to see secondary to Abn EKG and +Tropinins. (29 Aug 2019 21:40)     Allergies    No Known Allergies    Intolerances      DM2 (diabetes mellitus, type 2)  HTN (hypertension)    MEDICATIONS  (STANDING):  aspirin  chewable 81 milliGRAM(s) Oral daily  atorvastatin 80 milliGRAM(s) Oral at bedtime  dextrose 5%. 1000 milliLiter(s) (50 mL/Hr) IV Continuous <Continuous>  dextrose 50% Injectable 12.5 Gram(s) IV Push once  dextrose 50% Injectable 25 Gram(s) IV Push once  dextrose 50% Injectable 25 Gram(s) IV Push once  famotidine    Tablet 20 milliGRAM(s) Oral two times a day  insulin lispro (HumaLOG) corrective regimen sliding scale   SubCutaneous three times a day before meals  losartan 50 milliGRAM(s) Oral daily  metoprolol succinate ER 25 milliGRAM(s) Oral daily  spironolactone 25 milliGRAM(s) Oral daily  ticagrelor 90 milliGRAM(s) Oral every 12 hours    MEDICATIONS  (PRN):  acetaminophen   Tablet .. 650 milliGRAM(s) Oral every 6 hours PRN Mild Pain (1 - 3)  dextrose 40% Gel 15 Gram(s) Oral once PRN Blood Glucose LESS THAN 70 milliGRAM(s)/deciliter  glucagon  Injectable 1 milliGRAM(s) IntraMuscular once PRN Glucose LESS THAN 70 milligrams/deciliter  nitroglycerin     SubLingual 0.4 milliGRAM(s) SubLingual every 5 minutes PRN Chest Pain  zolpidem 5 milliGRAM(s) Oral at bedtime PRN Insomnia                           13.3   9.74  )-----------( 205      ( 30 Aug 2019 06:05 )             40.3     08-30    135  |  98  |  15.0  ----------------------------<  247<H>  4.2   |  25.0  |  0.78    Ca    8.9      30 Aug 2019 06:05  Mg     2.2     08-30    TPro  6.3<L>  /  Alb  3.5  /  TBili  0.6  /  DBili  x   /  AST  95<H>  /  ALT  32  /  AlkPhos  90  08-30    PT/INR - ( 29 Aug 2019 17:01 )   PT: 11.5 sec;   INR: 1.00 ratio         PTT - ( 29 Aug 2019 17:01 )  PTT:30.7 sec  ;  Vital Signs Last 24 Hrs  T(C): 37 (30 Aug 2019 15:51), Max: 37 (30 Aug 2019 15:51)  T(F): 98.6 (30 Aug 2019 15:51), Max: 98.6 (30 Aug 2019 15:51)  HR: 74 (30 Aug 2019 15:51) (61 - 74)  BP: 128/75 (30 Aug 2019 15:51) (127/71 - 167/81)  BP(mean): --  RR: 16 (30 Aug 2019 15:51) (14 - 20)  SpO2: 100% (30 Aug 2019 15:51) (97% - 100%)  CAPILLARY BLOOD GLUCOSE      08-29 @ 07:01  -  08-30 @ 07:00  --------------------------------------------------------  IN: 240 mL / OUT: 1000 mL / NET: -760 mL    08-30 @ 07:01  -  08-30 @ 19:09  --------------------------------------------------------  IN: 480 mL / OUT: 800 mL / NET: -320 mL      Patient feeling better No CP, No SOB, No N/V    HEENT: PEARLA  	Neck: Supple  	Cardio: S1 S2 No Murmur  	Pulm: CTA No Rales or Ronchi  	Abd: Soft NT ND BS+  	Rectal - refused  	Ext: No DCT cath site clean   	Skin: No Rash  Neuro: Awake Pleasant        Acute MI - Spoke with Dr Ortiz, Cath, ASA, Metoprolol, ticagrelor, statin  DM - SS and Accu-Chek  Hypokinesis - Spironolactone

## 2019-08-30 NOTE — PATIENT PROFILE ADULT - BRADEN MOBILITY
(3) slightly limited
GOALS: Pt will perform all transfers with CG/Anh with least restrictive device in 4wks

## 2019-08-30 NOTE — PROGRESS NOTE ADULT - SUBJECTIVE AND OBJECTIVE BOX
SUBJECTIVE:  Cardiology NP F/U note:  SP: C which revealed: 90% mLAD stenosis c/w thrombus now s/p JUNO x 2 and 70% dLAD now s/p JUNO x 1.  EF measured 35%.    pt had mild cp overnignt / none further  denies complaints of chest pain/sob/dizziness/palps  chalino diet / ambulating     	  MEDICATIONS  (STANDING):  aspirin  chewable 81 milliGRAM(s) Oral daily  atorvastatin 80 milliGRAM(s) Oral at bedtime  dextrose 5%. 1000 milliLiter(s) IV Continuous <Continuous>  dextrose 50% Injectable 12.5 Gram(s) IV Push once  dextrose 50% Injectable 25 Gram(s) IV Push once  dextrose 50% Injectable 25 Gram(s) IV Push once  famotidine    Tablet 20 milliGRAM(s) Oral two times a day  insulin lispro (HumaLOG) corrective regimen sliding scale   SubCutaneous three times a day before meals  losartan 50 milliGRAM(s) Oral daily  metoprolol succinate ER 25 milliGRAM(s) Oral daily  nitroglycerin    2% Ointment 0.5 Inch(s) Transdermal every 6 hours  spironolactone 25 milliGRAM(s) Oral daily  ticagrelor 90 milliGRAM(s) Oral every 12 hours    MEDICATIONS  (PRN):  acetaminophen   Tablet .. 650 milliGRAM(s) Oral every 6 hours PRN Mild Pain (1 - 3)  dextrose 40% Gel 15 Gram(s) Oral once PRN Blood Glucose LESS THAN 70 milliGRAM(s)/deciliter  glucagon  Injectable 1 milliGRAM(s) IntraMuscular once PRN Glucose LESS THAN 70 milligrams/deciliter  nitroglycerin     SubLingual 0.4 milliGRAM(s) SubLingual every 5 minutes PRN Chest Pain  zolpidem 5 milliGRAM(s) Oral at bedtime PRN Insomnia      PHYSICAL EXAM:    T(C): 36.8 (08-30-19 @ 10:29), Max: 36.8 (08-30-19 @ 10:29)  HR: 65 (08-30-19 @ 10:29) (61 - 84)  BP: 138/77 (08-30-19 @ 10:29) (127/71 - 183/69)  RR: 18 (08-30-19 @ 10:29) (14 - 20)  SpO2: 97% (08-30-19 @ 10:29) (97% - 99%)  Wt(kg): --    I&O's Summary    29 Aug 2019 07:01  -  30 Aug 2019 07:00  --------------------------------------------------------  IN: 240 mL / OUT: 1000 mL / NET: -760 mL    30 Aug 2019 07:01  -  30 Aug 2019 10:54  --------------------------------------------------------  IN: 240 mL / OUT: 400 mL / NET: -160 mL        Daily Height in cm: 167.64 (29 Aug 2019 23:35)    Daily     Appearance: NAD	  Cardiovascular: Normal S1 S2, RRR 58  No JVD, No murmurs, No edema  Respiratory: Lungs clear to auscultation	  Psychiatry: A & O x 3, Mood & affect appropriate  Gastrointestinal:  Soft, Non-tender, + BS	  Skin: warm and dry + generalized psorasis  Neurologic: Non-focal  Extremities: Normal range of motion,:  Right Groin soft /no hematoma  dressing removed  Vascular: Peripheral pulses palpable 2+ bilaterally    TELEMETRY:  RSR 60's / no events on tele    ECG:  	 RSRB 58/ evolving Ekg changes noted I AVL V1-V6 with biphasic T inversions.   RADIOLOGY:   DIAGNOSTIC TESTING:  [ ] Echocardiogram: pending   [ ]  Catheterization:  < from: Cardiac Cath Lab - Adult (08.29.19 @ 19:14) >  VENTRICLES: Analysis of regional contractile function demonstrated severe  anterolateral hypokinesis, apical akinesis, and severe diaphragmatic  hypokinesis. EF estimated was 35 %.  CORONARY VESSELS: The coronary circulation is left dominant.  LM:   --  LM: Angiography showed minor luminal irregularities with no flow  limiting lesions.  LAD:   --  Mid LAD: There was a diffuse 90 % stenosis. The lesion was  associated with a small filling defect consistent with thrombus. There was  WILLIAM grade 3 flow through the vessel (brisk flow). This lesion is a likely  culprit for the patient's recent myocardial infarction. An intervention  was performed.  --  Distal LAD: There was a 70 % stenosis. There was WILLIAM grade 3 flow  through the vessel (brisk flow). An intervention was performed.  CX:   --  LPDA: There was a discrete 60 % stenosis.  RI:   --  Ramus intermedius: Angiography showed minor luminal  irregularities with no flow limiting lesions.  RCA:   --  RCA: Angiography showed minor luminal irregularities with no  flow limiting lesions.  COMPLICATIONS: No complications occurred during the cath lab visit. No  complications occurred during the cath lab visit.  DIAGNOSTIC RECOMMENDATIONS: Continue metoprolol (Lopressor, Toprol). Add  spironolactone (Aldactone). Continue losartan (Cozaar). Add aspirin. Add  brilinta Add atorvastatin. The patient should continue with the present  medications.  	 	    CARDIAC MARKERS: positive                                   13.3   9.74  )-----------( 205      ( 30 Aug 2019 06:05 )             40.3     08-30    135  |  98  |  15.0  ----------------------------<  247<H>  4.2   |  25.0  |  0.78    Ca    8.9      30 Aug 2019 06:05  Mg     2.2     08-30    TPro  6.3<L>  /  Alb  3.5  /  TBili  0.6  /  DBili  x   /  AST  95<H>  /  ALT  32  /  AlkPhos  90  08-30    proBNP: Serum Pro-Brain Natriuretic Peptide: 166 pg/mL (08-29 @ 17:01)    Lipid Profile:   HgA1c: Hemoglobin A1C, Whole Blood: 6.3 % (08-30 @ 06:05)    ASSESSMENT:  74 M presents with chest pain since the am and AMBROCIO./ Code STEMI called on arrival to ED / converted to Urgent cath + troponins NSTEMI  LHC :90% mLAD stenosis c/w thrombus now s/p JUNO x 2 and 70% dLAD now s/p JUNO x 1.  EF measured 35%.    HX : DM HTN/ CAD now with rEF   Echo is pending   Hemodynamically stable overnight / CP resolved/ labs and EKG reviewed   Plan:  continue current meds ASA/ Brillinta/ BB/ statin/ ARB   continue monitoring overnight   check Echo results  med compliance/ groin care and follow up discussed   d/c NTP   cardiac rehab info provided/referral and communication to cardiac rehab completed

## 2019-08-30 NOTE — PROVIDER CONTACT NOTE (CRITICAL VALUE NOTIFICATION) - ACTION/TREATMENT ORDERED:
No interventions due at this time. Trop will be drawn again at 4a.m. Will continue to monitor. Dr. Gonzalez called at 03:30 and made aware of result. No interventions due at this time. Trop will be drawn again at 4a.m. Will continue to monitor.

## 2019-08-30 NOTE — PROVIDER CONTACT NOTE (CRITICAL VALUE NOTIFICATION) - BACKGROUND
Patient came into the ED complaining of chest pain. 1 positive trop. Urgent cath done on 8/29, pt received 3 stents. Post cath, pt complained of 3/10 chest pain. Morphine and nitro paste given to patient. Trop ordered for 1a.m.

## 2019-08-30 NOTE — PATIENT PROFILE ADULT - NSPROPTRIGHTBILLOFRIGHTS_GEN_A_NUR
Bronchiolitis (RSV Infection) (Child)    The lungs have many small breathing tubes. These tubes are called bronchioles. If the lining of these tubes get inflamed and swollen, the condition is called bronchiolitis. It occurs most often in children up to age 2.  Bronchiolitis often occurs in the winter. It starts with a cold. Your child may first have a runny nose, mild cough, fever, and a cough with mucus. After a few days, the cough may get worse. Your child will start to breathe faster, wheeze, and grunt. Wheezing is a whistling sound caused by breathing through narrowed airways. In severe cases, breathing can stop for short periods.  Bronchiolitis is treated by helping your childs breathing. The healthcare provider may suction mucus from your childs nose and mouth. He or she may give medicines for a cough or fever. Children who have trouble breathing or eating may need to stay in the hospital for 1 or more nights. They may receive intravenous (IV) fluids, oxygen, or asthma medicine with a breathing machine. Symptoms usually get better in 2 to 5 days. But they may last for weeks. In some cases, your child may need an antiviral medicine. This is to help prevent the condition from coming back. Antibiotic treatment is usually not required for this illness, unless it is complicated by a bacterial infection such as pneumonia or an ear infection.  Babies under 12 weeks of age or children with a chronic illness are at higher risk for severe bronchiolitis. Complications can include dehydration and a lung infection called pneumonia. A child who has bronchiolitis is more likely to have bouts of wheezing when he or she is older.  Home care  Follow these guidelines when caring for your child at home:  · Your childs healthcare provider may prescribe medicines to treat wheezing. Follow all instructions for giving these medicines to your child.  · Use childrens acetaminophen for fever, fussiness, or discomfort, unless  another medicine was prescribed. In infants over 6 months of age, you may use childrens ibuprofen or acetaminophen. (Note: If your child has chronic liver or kidney disease or has ever had a stomach ulcer or gastrointestinal bleeding, talk with your healthcare provider before using these medicines.) Aspirin should never be given to anyone younger than 18 years of age who is ill with a viral infection or fever. It may cause severe liver or brain damage.  · Wash your hands well with soap and warm water before and after caring for your child. This is to help prevent spreading infection.  · Give your child plenty of time to rest. Have your child sleep in a slightly upright position. This is to help make breathing easier. If possible, raise the head of the bed a few inches. Or prop your childs body up with pillows.  · Make sure your older child blows his or her nose effectively. Your childs healthcare provider may recommend saline nose drops to help thin and remove nasal secretions. Saline nose drops are available without a prescription. You can also use 1/4 teaspoon of table salt mixed well in 1 cup of water. You may put 2 to 3 drops of saline drops in each nostril before having your child blow his or her nose. Always wash your hands after touching used tissues.  · For younger children, suction mucus from the nose with saline nose drops and a small bulb syringe. Talk with your childs healthcare provider or pharmacist if you dont know how to use a bulb syringe. Always wash your hands after using a bulb syringe or touching used tissues.  · To prevent dehydration and help loosen lung secretions in toddlers and older children, make sure your child drinks plenty of liquids. Children may prefer cold drinks, frozen desserts, or ice pops. They may also like warm soup or drinks with lemon and honey. Dont give honey to a child younger than 1 year old.  · To prevent dehydration and help loosen lung secretions in infants  under 1 year old, make sure your child drinks plenty of liquids. Use a medicine dropper, if needed, to give small amounts of breast milk, formula, or oral rehydration solution to your baby. Give 1 to 2 teaspoons every 10 to 15 minutes. A baby may only be able to feed for short amounts of time. If you are breastfeeding, pump and store milk for later use. Give your child oral rehydration solution between feedings. This is available from grocery stores and drugstores without a prescription.  · To make breathing easier during sleep, use a cool-mist humidifier in your childs bedroom. Clean and dry the humidifier daily to prevent bacteria and mold growth. Dont use a hot-water vaporizer. It can cause burns. Your child may also feel more comfortable sitting in a steamy bathroom for up to 10 minutes.  · Over-the-counter cough and cold medicine has not been proven to be any more helpful than a placebo (syrup with no medicine in it). In addition, these medicines can produce serious side effects, especially in infants under 2 years of age. Do not give over-the-counter cough and cold medicines to children under 6 years unless your healthcare provider has specifically advised you to do so.  · Dont expose your child to cigarette smoke. Tobacco smoke can make your childs symptoms worse.  Follow-up care  Follow up with your healthcare provider, or as advised.  Note: If your child had an X-ray, it will be reviewed by a specialist. You will be notified of any new findings that may affect your child's care.  When to seek medical advice  For a usually healthy child, call your child's healthcare provider right away if any of these occur:  · Your child is 3 months old or younger and has a fever of 100.4°F (38°C) or higher. Get medical care right away. Fever in a young baby can be a sign of a dangerous infection.  · Your child is of any age and has repeated fevers above 104°F (40°C).  · Your child is younger than 2 years of age and a  fever of 100.4°F (38°C) continues for more than 1 day.  · Your child is 2 years old or older and a fever of 100.4°F (38°C) continues for more than 3 days.  · Symptoms dont get better, or get worse.  · Breathing difficulty doesnt get better.  · Your child loses his or her appetite or feeds poorly.  · Your child has an earache, sinus pain, a stiff or painful neck, headache, repeated diarrhea, or vomiting.  · A new rash appears.  Call 911, or get immediate medical care  Contact emergency services if any of these occur:  · Increasing trouble breathing  · Fast breathing, as follows:  ¨ Birth to 6 weeks: over 60 breaths per minute.  ¨ 6 weeks to 2 years: over 45 breaths per minute.  ¨ 3 to 6 years: over 35 breaths per minute.  ¨ 7 to 10 years: over 30 breaths per minute.  ¨ Older than 10 years: over 25 breaths per minute.  · Blue tint to the lips or fingernails  · Signs of dehydration, such as dry mouth, crying with no tears, or urinating less than normal; no wet diapers for 8 hours in infants  · Unusual fussiness, drowsiness, or confusion  Date Last Reviewed: 9/13/2015  © 1216-0314 [employer]. [address] [place] [address], [place] [address]. All rights reserved. This information is not intended as a substitute for professional medical care. Always follow your healthcare professional's instructions.         patient

## 2019-08-30 NOTE — PATIENT PROFILE ADULT - PRIMARY ROLES/RESPONSIBILITIES
Referred To Otolaryngology For Closure Text (Leave Blank If You Do Not Want): After obtaining clear surgical margins the patient was sent to otolaryngology for surgical repair.  The patient understands they will receive post-surgical care and follow-up from the referring physician's office. retired

## 2019-08-30 NOTE — PROGRESS NOTE ADULT - ATTENDING COMMENTS
PCI performed by Dr. Ortiz.   Still with mild symptoms. DC NTG paste for now.   On appropriate meds.   Monitor overnight tonight. TTE.   Will consider dc planning for tomorrow if stable.

## 2019-08-31 VITALS
OXYGEN SATURATION: 98 % | DIASTOLIC BLOOD PRESSURE: 70 MMHG | SYSTOLIC BLOOD PRESSURE: 120 MMHG | HEART RATE: 82 BPM | RESPIRATION RATE: 18 BRPM | TEMPERATURE: 98 F

## 2019-08-31 LAB — GLUCOSE BLDC GLUCOMTR-MCNC: 141 MG/DL — HIGH (ref 70–99)

## 2019-08-31 RX ORDER — ATORVASTATIN CALCIUM 80 MG/1
1 TABLET, FILM COATED ORAL
Qty: 30 | Refills: 0
Start: 2019-08-31

## 2019-08-31 RX ORDER — LOSARTAN POTASSIUM 100 MG/1
1 TABLET, FILM COATED ORAL
Qty: 30 | Refills: 0
Start: 2019-08-31

## 2019-08-31 RX ORDER — ACETAMINOPHEN 500 MG
2 TABLET ORAL
Qty: 0 | Refills: 0 | DISCHARGE
Start: 2019-08-31

## 2019-08-31 RX ORDER — ASPIRIN/CALCIUM CARB/MAGNESIUM 324 MG
1 TABLET ORAL
Qty: 0 | Refills: 0 | DISCHARGE
Start: 2019-08-31

## 2019-08-31 RX ORDER — SPIRONOLACTONE 25 MG/1
1 TABLET, FILM COATED ORAL
Qty: 30 | Refills: 0
Start: 2019-08-31

## 2019-08-31 RX ORDER — TICAGRELOR 90 MG/1
1 TABLET ORAL
Qty: 60 | Refills: 0
Start: 2019-08-31

## 2019-08-31 RX ORDER — ATORVASTATIN CALCIUM 80 MG/1
1 TABLET, FILM COATED ORAL
Qty: 30 | Refills: 0
Start: 2019-08-31 | End: 2019-09-29

## 2019-08-31 RX ORDER — LOSARTAN POTASSIUM 100 MG/1
1 TABLET, FILM COATED ORAL
Qty: 0 | Refills: 0 | DISCHARGE

## 2019-08-31 RX ADMIN — Medication 25 MILLIGRAM(S): at 05:38

## 2019-08-31 RX ADMIN — FAMOTIDINE 20 MILLIGRAM(S): 10 INJECTION INTRAVENOUS at 05:38

## 2019-08-31 RX ADMIN — LOSARTAN POTASSIUM 50 MILLIGRAM(S): 100 TABLET, FILM COATED ORAL at 05:38

## 2019-08-31 RX ADMIN — Medication 81 MILLIGRAM(S): at 08:58

## 2019-08-31 RX ADMIN — SPIRONOLACTONE 25 MILLIGRAM(S): 25 TABLET, FILM COATED ORAL at 05:38

## 2019-08-31 RX ADMIN — TICAGRELOR 90 MILLIGRAM(S): 90 TABLET ORAL at 05:38

## 2019-08-31 NOTE — PROGRESS NOTE ADULT - SUBJECTIVE AND OBJECTIVE BOX
SUBJECTIVE:  Cardiology NP F/U note:  SP: [facility] which revealed:         	  MEDICATIONS:  losartan 50 milliGRAM(s) Oral daily  metoprolol succinate ER 25 milliGRAM(s) Oral daily  nitroglycerin     SubLingual 0.4 milliGRAM(s) SubLingual every 5 minutes PRN  spironolactone 25 milliGRAM(s) Oral daily        acetaminophen   Tablet .. 650 milliGRAM(s) Oral every 6 hours PRN  zolpidem 5 milliGRAM(s) Oral at bedtime PRN    famotidine    Tablet 20 milliGRAM(s) Oral two times a day    atorvastatin 80 milliGRAM(s) Oral at bedtime  dextrose 40% Gel 15 Gram(s) Oral once PRN  dextrose 50% Injectable 12.5 Gram(s) IV Push once  dextrose 50% Injectable 25 Gram(s) IV Push once  dextrose 50% Injectable 25 Gram(s) IV Push once  glucagon  Injectable 1 milliGRAM(s) IntraMuscular once PRN  insulin lispro (HumaLOG) corrective regimen sliding scale   SubCutaneous three times a day before meals    aspirin  chewable 81 milliGRAM(s) Oral daily  dextrose 5%. 1000 milliLiter(s) IV Continuous <Continuous>  ticagrelor 90 milliGRAM(s) Oral every 12 hours        PHYSICAL EXAM:    T(C): 36.9 (19 @ 05:37), Max: 37 (19 @ 15:51)  HR: 86 (19 @ 08:59) (68 - 86)  BP: 132/79 (19 @ 08:59) (128/75 - 154/84)  RR: 16 (19 @ 08:59) (16 - 17)  SpO2: 98% (19 @ 08:59) (96% - 100%)  Wt(kg): --    I&O's Summary    30 Aug 2019 07:01  -  31 Aug 2019 07:00  --------------------------------------------------------  IN: 720 mL / OUT: 1100 mL / NET: -380 mL        Daily     Daily Weight in k.1 (31 Aug 2019 04:48)    Appearance: Normal	  HEENT:   Normal oral mucosa,   Lymphatic: No lymphadenopathy  Cardiovascular: Normal S1 S2, No JVD, No murmurs, No edema  Respiratory: Lungs clear to auscultation	  Psychiatry: A & O x 3, Mood & affect appropriate  Gastrointestinal:  Soft, Non-tender, + BS	  Skin: warm and dry  Neurologic: Non-focal  Extremities: Normal range of motion,:  Right Radial no hematoma / good pulse / dressing removed  Right Groin soft /no hematoma  dressing removed  Vascular: Peripheral pulses palpable 2+ bilaterally    TELEMETRY: 	    ECG:  	  RADIOLOGY:   DIAGNOSTIC TESTING:  [ ] Echocardiogram:  [ ]  Catheterization:  [ ] Stress Test:    OTHER: 	    LABS:	 	    CARDIAC MARKERS:                                  13.3   9.74  )-----------( 205      ( 30 Aug 2019 06:05 )             40.3     08-    135  |  98  |  15.0  ----------------------------<  247<H>  4.2   |  25.0  |  0.78    Ca    8.9      30 Aug 2019 06:05  Mg     2.2     -    TPro  6.3<L>  /  Alb  3.5  /  TBili  0.6  /  DBili  x   /  AST  95<H>  /  ALT  32  /  AlkPhos  90      proBNP:   Lipid Profile:   HgA1c:   TSH:     ASSESSMENT:      Plan: SUBJECTIVE:  Cardiology NP F/U note:  SP: [facility] which revealed:     < from: Cardiac Cath Lab - Adult (19 @ 19:14) >  VENTRICLES: Analysis of regional contractile function demonstrated severe  anterolateral hypokinesis, apical akinesis, and severe diaphragmatic  hypokinesis. EF estimated was 35 %.  CORONARY VESSELS: The coronary circulation is left dominant.  LM:   --  LM: Angiography showed minor luminal irregularities with no flow  limiting lesions.  LAD:   --  Mid LAD: There was a diffuse 90 % stenosis. The lesion was  associated with a small filling defect consistent with thrombus. There was  WILLIAM grade 3 flow through the vessel (brisk flow). This lesion is a likely  culprit for the patient's recent myocardial infarction. An intervention  was performed.  --  Distal LAD: There was a 70 % stenosis. There was WILLIAM grade 3 flow  through the vessel (brisk flow). An intervention was performed.  CX:   --  LPDA: There was a discrete 60 % stenosis.  RI:   --  Ramus intermedius: Angiography showed minor luminal  irregularities with no flow limiting lesions.  RCA:   --  RCA: Angiography showed minor luminal irregularities with no  flow limiting lesions.  COMPLICATIONS: No complications occurred during the cath lab visit. No  complications occurred during the cath lab visit.  DIAGNOSTIC RECOMMENDATIONS: Continue metoprolol (Lopressor, Toprol). Add  spironolactone (Aldactone). Continue losartan (Cozaar). Add aspirin. Add  brilinta Add atorvastatin. The patient should continue with the present  medications.  INTERVENTIONAL RECOMMENDATIONS: Continue metoprolol (Lopressor, Toprol).  Add spironolactone (Aldactone). Continue losartan (Cozaar). Add aspirin.  Add brilinta Add atorvastatin.  DISPOSITION: The patient left the catheterization laboratory in guarded  condition.  Prepared and signed by  Albin Ortiz MD  Signed 2019 20:21:33    < end of copied text >  < from: Cardiac Cath Lab - Adult (19 @ 19:14) >  A CODY 2.25 X 22MM drug-eluting stent was placed across the lesion  and deployed at a maximum inflation pressure of 14 marisel. A successful  drug-eluting stent was performed on the 90 % lesion in the mid LAD.    < end of copied text >  < from: Cardiac Cath Lab - Adult (19 @ 19:14) >  A successful drug-eluting stent was performed on the 70  % lesion in the distal LAD. Following intervention there was an excellent  angiographic appearance with a 1 % residual stenosis.     < end of copied text >    Patient without complaints overnight of chest pain or SOB  	  MEDICATIONS:  losartan 50 milliGRAM(s) Oral daily  metoprolol succinate ER 25 milliGRAM(s) Oral daily  nitroglycerin     SubLingual 0.4 milliGRAM(s) SubLingual every 5 minutes PRN  spironolactone 25 milliGRAM(s) Oral daily        acetaminophen   Tablet .. 650 milliGRAM(s) Oral every 6 hours PRN  zolpidem 5 milliGRAM(s) Oral at bedtime PRN    famotidine    Tablet 20 milliGRAM(s) Oral two times a day    atorvastatin 80 milliGRAM(s) Oral at bedtime  dextrose 40% Gel 15 Gram(s) Oral once PRN  dextrose 50% Injectable 12.5 Gram(s) IV Push once  dextrose 50% Injectable 25 Gram(s) IV Push once  dextrose 50% Injectable 25 Gram(s) IV Push once  glucagon  Injectable 1 milliGRAM(s) IntraMuscular once PRN  insulin lispro (HumaLOG) corrective regimen sliding scale   SubCutaneous three times a day before meals    aspirin  chewable 81 milliGRAM(s) Oral daily  dextrose 5%. 1000 milliLiter(s) IV Continuous <Continuous>  ticagrelor 90 milliGRAM(s) Oral every 12 hours        PHYSICAL EXAM:    T(C): 36.9 (19 @ 05:37), Max: 37 (19 @ 15:51)  HR: 86 (19 @ 08:59) (68 - 86)  BP: 132/79 (19 @ 08:59) (128/75 - 154/84)  RR: 16 (19 @ 08:59) (16 - 17)  SpO2: 98% (19 @ 08:59) (96% - 100%)      I&O's Summary    30 Aug 2019 07:01  -  31 Aug 2019 07:00  --------------------------------------------------------  IN: 720 mL / OUT: 1100 mL / NET: -380 mL        Daily     Daily Weight in k.1 (31 Aug 2019 04:48)    Appearance: Normal	  HEENT:   Normal oral mucosa,   Lymphatic: No lymphadenopathy  Cardiovascular: Normal S1 S2, No JVD, No murmurs, No edema  Respiratory: Lungs clear to auscultation	  Psychiatry: A & O x 3, Mood & affect appropriate  Gastrointestinal:  Soft, Non-tender, + BS	  Skin: warm and dry  Neurologic: Non-focal  Extremities: Normal range of motion,:  Right Groin soft /no hematoma  Site KRUPA  Vascular: Peripheral pulses palpable 2+ bilaterally    TELEMETRY:  Sinus Rhythm no events overnight   ECG:  	  RADIOLOGY:   DIAGNOSTIC TESTING:  [X] Echocardiogram:  [X]  Catheterization:  [ ] Stress Test:    OTHER: 	    LABS:	 	    CARDIAC MARKERS: Positive                                  13.3   9.74  )-----------( 205      ( 30 Aug 2019 06:05 )             40.3         135  |  98  |  15.0  ----------------------------<  247<H>  4.2   |  25.0  |  0.78    Ca    8.9      30 Aug 2019 06:05  Mg     2.2         TPro  6.3<L>  /  Alb  3.5  /  TBili  0.6  /  DBili  x   /  AST  95<H>  /  ALT  32  /  AlkPhos  90      proBNP:   Lipid Profile:   HgA1c:   TSH:     Echocardiogram:  < from: TTE Echo w/Cont Complete (19 @ 12:10) >  Summary:   1. Left ventricular ejection fraction, by visual estimation, is 40 to   45%.   2. Technically difficult study.   3. Moderately decreased global leftventricular systolic function.   4. Entire apex is abnormal as described above.   5. LV Ejection Fraction by Yeh's Method with a biplane EF of 44 %.   6. Normal left ventricular internal cavity size.   7. Spectral Doppler shows impaired relaxation pattern of left   ventricular myocardial filling (Grade I diastolic dysfunction).   8. Trivial pericardial effusion.   9. Endocardial visualization was enhanced with intravenous echo contrast.  10. Peak transaortic gradient equals 7.7 mmHg, mean transaortic gradient   equals 4.0 mmHg, the calculated aortic valve area equals 1.98 cm² by the   continuity equation consistent with mild aortic stenosis.    MD Ranjan Electronically signed on 2019 at 1:17:53 PM       < end of copied text >      ASSESSMENT:74 M presents with chest pain since the am and AMBROCIO./ Code STEMI called on arrival to ED / converted to Urgent cath + troponins NSTEMI  LHC :90% mLAD stenosis c/w thrombus now s/p JUNO x 2 and 70% dLAD now s/p JUNO x 1.  EF measured 35%.    HX : DM HTN/ CAD now with rEF   Echo is pending   Hemodynamically stable overnight / CP resolved/ labs and EKG reviewed   Plan:  continue current meds ASA/ Brillinta/ BB/ statin/ ARB   continue monitoring overnight   check Echo results  med compliance/ groin care and follow up discussed   d/c NTP   cardiac rehab info provided/referral and communication to cardiac rehab completed      Assessment and Plan:    Problem/Plan - 1:  ·  Problem: NSTEMI (non-ST elevated myocardial infarction).  Plan: as above.     Attending Attestation:   PCI performed by Dr. Ortiz.   Still with mild symptoms. DC NTG paste for now.   On appropriate meds.   Patient ok to DC to home on Losartan, Metoprolol, Brilinta, aspirin, zolpidem, spironolactone and Lipitor from cardiology perspective   Follow up with Dr Ortiz within on 7-10 days post discharge     I have personally seen, examined, and participated in the care of this patient. I have reviewed all pertinent clinical information, including history, physical exam, plan, and the Medical Student's note and agree except as noted.            Plan: SUBJECTIVE:  Cardiology NP F/U note:  SP: [facility] which revealed:     < from: Cardiac Cath Lab - Adult (19 @ 19:14) >  VENTRICLES: Analysis of regional contractile function demonstrated severe  anterolateral hypokinesis, apical akinesis, and severe diaphragmatic  hypokinesis. EF estimated was 35 %.  CORONARY VESSELS: The coronary circulation is left dominant.  LM:   --  LM: Angiography showed minor luminal irregularities with no flow  limiting lesions.  LAD:   --  Mid LAD: There was a diffuse 90 % stenosis. The lesion was  associated with a small filling defect consistent with thrombus. There was  WILLIAM grade 3 flow through the vessel (brisk flow). This lesion is a likely  culprit for the patient's recent myocardial infarction. An intervention  was performed.  --  Distal LAD: There was a 70 % stenosis. There was WILLIAM grade 3 flow  through the vessel (brisk flow). An intervention was performed.  CX:   --  LPDA: There was a discrete 60 % stenosis.  RI:   --  Ramus intermedius: Angiography showed minor luminal  irregularities with no flow limiting lesions.  RCA:   --  RCA: Angiography showed minor luminal irregularities with no  flow limiting lesions.  COMPLICATIONS: No complications occurred during the cath lab visit. No  complications occurred during the cath lab visit.  DIAGNOSTIC RECOMMENDATIONS: Continue metoprolol (Lopressor, Toprol). Add  spironolactone (Aldactone). Continue losartan (Cozaar). Add aspirin. Add  brilinta Add atorvastatin. The patient should continue with the present  medications.  INTERVENTIONAL RECOMMENDATIONS: Continue metoprolol (Lopressor, Toprol).  Add spironolactone (Aldactone). Continue losartan (Cozaar). Add aspirin.  Add brilinta Add atorvastatin.  DISPOSITION: The patient left the catheterization laboratory in guarded  condition.  Prepared and signed by  Albin Ortiz MD  Signed 2019 20:21:33    < end of copied text >  < from: Cardiac Cath Lab - Adult (19 @ 19:14) >  A CODY 2.25 X 22MM drug-eluting stent was placed across the lesion  and deployed at a maximum inflation pressure of 14 marisel. A successful  drug-eluting stent was performed on the 90 % lesion in the mid LAD.    < end of copied text >  < from: Cardiac Cath Lab - Adult (19 @ 19:14) >  A successful drug-eluting stent was performed on the 70  % lesion in the distal LAD. Following intervention there was an excellent  angiographic appearance with a 1 % residual stenosis.     < end of copied text >    Patient without complaints overnight of chest pain or SOB  	  MEDICATIONS:  losartan 50 milliGRAM(s) Oral daily  metoprolol succinate ER 25 milliGRAM(s) Oral daily  nitroglycerin     SubLingual 0.4 milliGRAM(s) SubLingual every 5 minutes PRN  spironolactone 25 milliGRAM(s) Oral daily        acetaminophen   Tablet .. 650 milliGRAM(s) Oral every 6 hours PRN  zolpidem 5 milliGRAM(s) Oral at bedtime PRN    famotidine    Tablet 20 milliGRAM(s) Oral two times a day    atorvastatin 80 milliGRAM(s) Oral at bedtime  dextrose 40% Gel 15 Gram(s) Oral once PRN  dextrose 50% Injectable 12.5 Gram(s) IV Push once  dextrose 50% Injectable 25 Gram(s) IV Push once  dextrose 50% Injectable 25 Gram(s) IV Push once  glucagon  Injectable 1 milliGRAM(s) IntraMuscular once PRN  insulin lispro (HumaLOG) corrective regimen sliding scale   SubCutaneous three times a day before meals    aspirin  chewable 81 milliGRAM(s) Oral daily  dextrose 5%. 1000 milliLiter(s) IV Continuous <Continuous>  ticagrelor 90 milliGRAM(s) Oral every 12 hours        PHYSICAL EXAM:    T(C): 36.9 (19 @ 05:37), Max: 37 (19 @ 15:51)  HR: 86 (19 @ 08:59) (68 - 86)  BP: 132/79 (19 @ 08:59) (128/75 - 154/84)  RR: 16 (19 @ 08:59) (16 - 17)  SpO2: 98% (19 @ 08:59) (96% - 100%)      I&O's Summary    30 Aug 2019 07:01  -  31 Aug 2019 07:00  --------------------------------------------------------  IN: 720 mL / OUT: 1100 mL / NET: -380 mL        Daily     Daily Weight in k.1 (31 Aug 2019 04:48)    Appearance: Normal	  HEENT:   Normal oral mucosa,   Lymphatic: No lymphadenopathy  Cardiovascular: Normal S1 S2, No JVD, No murmurs, No edema  Respiratory: Lungs clear to auscultation	  Psychiatry: A & O x 3, Mood & affect appropriate  Gastrointestinal:  Soft, Non-tender, + BS	  Skin: warm and dry  Neurologic: Non-focal  Extremities: Normal range of motion,:  Right Groin soft /no hematoma  Site KRUPA  Vascular: Peripheral pulses palpable 2+ bilaterally    TELEMETRY:  Sinus Rhythm no events overnight   ECG:  	  RADIOLOGY:   DIAGNOSTIC TESTING:  [X] Echocardiogram:  [X]  Catheterization:  [ ] Stress Test:    OTHER: 	    LABS:	 	    CARDIAC MARKERS: Positive                                  13.3   9.74  )-----------( 205      ( 30 Aug 2019 06:05 )             40.3         135  |  98  |  15.0  ----------------------------<  247<H>  4.2   |  25.0  |  0.78    Ca    8.9      30 Aug 2019 06:05  Mg     2.2         TPro  6.3<L>  /  Alb  3.5  /  TBili  0.6  /  DBili  x   /  AST  95<H>  /  ALT  32  /  AlkPhos  90      proBNP:   Lipid Profile:   HgA1c:   TSH:     Echocardiogram:  < from: TTE Echo w/Cont Complete (19 @ 12:10) >  Summary:   1. Left ventricular ejection fraction, by visual estimation, is 40 to   45%.   2. Technically difficult study.   3. Moderately decreased global leftventricular systolic function.   4. Entire apex is abnormal as described above.   5. LV Ejection Fraction by Yeh's Method with a biplane EF of 44 %.   6. Normal left ventricular internal cavity size.   7. Spectral Doppler shows impaired relaxation pattern of left   ventricular myocardial filling (Grade I diastolic dysfunction).   8. Trivial pericardial effusion.   9. Endocardial visualization was enhanced with intravenous echo contrast.  10. Peak transaortic gradient equals 7.7 mmHg, mean transaortic gradient   equals 4.0 mmHg, the calculated aortic valve area equals 1.98 cm² by the   continuity equation consistent with mild aortic stenosis.    MD Ranjan Electronically signed on 2019 at 1:17:53 PM       < end of copied text >      ASSESSMENT:74 M presents with chest pain since the am and AMBROCIO./ Code STEMI called on arrival to ED / converted to Urgent cath + troponins NSTEMI  LHC :90% mLAD stenosis c/w thrombus now s/p JUNO x 2 and 70% dLAD now s/p JUNO x 1.  EF measured 35%.    HX : DM HTN/ CAD now with rEF   Echo is pending   Hemodynamically stable overnight / CP resolved/ labs and EKG reviewed   Plan:  continue current meds ASA/ Brillinta/ BB/ statin/ ARB   continue monitoring overnight   check Echo results  med compliance/ groin care and follow up discussed   d/c NTP   cardiac rehab info provided/referral and communication to cardiac rehab completed      Assessment and Plan:    Problem/Plan - 1:  ·  Problem: NSTEMI (non-ST elevated myocardial infarction).  Plan: as above.     Attending Attestation:   PCI performed by Dr. Ortiz.   Still with mild symptoms. DC NTG paste for now.   On appropriate meds.   Patient ok to DC to home on Losartan, Metoprolol, Brilinta, aspirin, zolpidem, spironolactone and Lipitor from cardiology perspective   Follow up with Dr Ortiz within on 7-10 days post discharge   cardiac rehab info provided/referral and communication to cardiac rehab completed      I have personally seen, examined, and participated in the care of this patient. I have reviewed all pertinent clinical information, including history, physical exam, plan, and the Medical Student's note and agree except as noted.            Plan:

## 2019-08-31 NOTE — DISCHARGE NOTE NURSING/CASE MANAGEMENT/SOCIAL WORK - PATIENT PORTAL LINK FT
You can access the FollowMyHealth Patient Portal offered by [facility] by registering at the following website: [email or web address]. By joining [employer]’s FollowMyHealth portal, you will also be able to view your health information using other applications (apps) compatible with our system.

## 2019-09-29 PROCEDURE — 85730 THROMBOPLASTIN TIME PARTIAL: CPT

## 2019-09-29 PROCEDURE — C8929: CPT

## 2019-09-29 PROCEDURE — C1769: CPT

## 2019-09-29 PROCEDURE — 85027 COMPLETE CBC AUTOMATED: CPT

## 2019-09-29 PROCEDURE — 93458 L HRT ARTERY/VENTRICLE ANGIO: CPT | Mod: XU

## 2019-09-29 PROCEDURE — C9600: CPT | Mod: LD

## 2019-09-29 PROCEDURE — 82962 GLUCOSE BLOOD TEST: CPT

## 2019-09-29 PROCEDURE — 93005 ELECTROCARDIOGRAM TRACING: CPT

## 2019-09-29 PROCEDURE — 99152 MOD SED SAME PHYS/QHP 5/>YRS: CPT

## 2019-09-29 PROCEDURE — 82553 CREATINE MB FRACTION: CPT

## 2019-09-29 PROCEDURE — 83880 ASSAY OF NATRIURETIC PEPTIDE: CPT

## 2019-09-29 PROCEDURE — 83036 HEMOGLOBIN GLYCOSYLATED A1C: CPT

## 2019-09-29 PROCEDURE — 85610 PROTHROMBIN TIME: CPT

## 2019-09-29 PROCEDURE — 80053 COMPREHEN METABOLIC PANEL: CPT

## 2019-09-29 PROCEDURE — 99153 MOD SED SAME PHYS/QHP EA: CPT

## 2019-09-29 PROCEDURE — 83735 ASSAY OF MAGNESIUM: CPT

## 2019-09-29 PROCEDURE — 84484 ASSAY OF TROPONIN QUANT: CPT

## 2019-09-29 PROCEDURE — C1874: CPT

## 2019-09-29 PROCEDURE — 36415 COLL VENOUS BLD VENIPUNCTURE: CPT

## 2019-09-29 PROCEDURE — 80061 LIPID PANEL: CPT

## 2019-09-29 PROCEDURE — C1760: CPT

## 2019-09-29 PROCEDURE — C1725: CPT

## 2019-09-29 PROCEDURE — 71045 X-RAY EXAM CHEST 1 VIEW: CPT

## 2019-09-29 PROCEDURE — C1887: CPT

## 2019-09-29 PROCEDURE — C1894: CPT

## 2019-09-29 PROCEDURE — 99291 CRITICAL CARE FIRST HOUR: CPT | Mod: 25

## 2019-09-29 PROCEDURE — 82550 ASSAY OF CK (CPK): CPT

## 2019-10-15 PROBLEM — I10 ESSENTIAL (PRIMARY) HYPERTENSION: Chronic | Status: ACTIVE | Noted: 2019-08-29

## 2019-10-15 PROBLEM — E11.9 TYPE 2 DIABETES MELLITUS WITHOUT COMPLICATIONS: Chronic | Status: ACTIVE | Noted: 2019-08-29

## 2019-10-16 ENCOUNTER — OUTPATIENT (OUTPATIENT)
Dept: OUTPATIENT SERVICES | Facility: HOSPITAL | Age: 74
LOS: 1 days | End: 2019-10-16
Payer: MEDICARE

## 2019-10-16 DIAGNOSIS — Z95.5 PRESENCE OF CORONARY ANGIOPLASTY IMPLANT AND GRAFT: ICD-10-CM

## 2019-10-16 DIAGNOSIS — I25.41 CORONARY ARTERY ANEURYSM: ICD-10-CM

## 2019-12-16 PROCEDURE — 93798 PHYS/QHP OP CAR RHAB W/ECG: CPT

## 2021-07-27 NOTE — PATIENT PROFILE ADULT - PACKS YRS CALCULATION
Tazorac Pregnancy And Lactation Text: This medication is not safe during pregnancy. It is unknown if this medication is excreted in breast milk. Cephalexin Pregnancy And Lactation Text: This medication is Pregnancy Category B and considered safe during pregnancy.  It is also excreted in breast milk but can be used safely for shorter doses. Use Enhanced Medication Counseling?: No Benzoyl Peroxide Counseling: Patient counseled that medicine may cause skin irritation and bleach clothing.  In the event of skin irritation, the patient was advised to reduce the amount of the drug applied or use it less frequently.   The patient verbalized understanding of the proper use and possible adverse effects of benzoyl peroxide.  All of the patient's questions and concerns were addressed. Picato Counseling:  I discussed with the patient the risks of Picato including but not limited to erythema, scaling, itching, weeping, crusting, and pain. Birth Control Pills Pregnancy And Lactation Text: This medication should be avoided if pregnant and for the first 30 days post-partum. Minocycline Counseling: Patient advised regarding possible photosensitivity and discoloration of the teeth, skin, lips, tongue and gums.  Patient instructed to avoid sunlight, if possible.  When exposed to sunlight, patients should wear protective clothing, sunglasses, and sunscreen.  The patient was instructed to call the office immediately if the following severe adverse effects occur:  hearing changes, easy bruising/bleeding, severe headache, or vision changes.  The patient verbalized understanding of the proper use and possible adverse effects of minocycline.  All of the patient's questions and concerns were addressed. Elidel Counseling: Patient may experience a mild burning sensation during topical application. Elidel is not approved in children less than 2 years of age. There have been case reports of hematologic and skin malignancies in patients using topical calcineurin inhibitors although causality is questionable. Nsaids Counseling: NSAID Counseling: I discussed with the patient that NSAIDs should be taken with food. Prolonged use of NSAIDs can result in the development of stomach ulcers.  Patient advised to stop taking NSAIDs if abdominal pain occurs.  The patient verbalized understanding of the proper use and possible adverse effects of NSAIDs.  All of the patient's questions and concerns were addressed. Minocycline Pregnancy And Lactation Text: This medication is Pregnancy Category D and not consider safe during pregnancy. It is also excreted in breast milk. Elidel Pregnancy And Lactation Text: This medication is Pregnancy Category C. It is unknown if this medication is excreted in breast milk. Siliq Pregnancy And Lactation Text: The risk during pregnancy and breastfeeding is uncertain with this medication. Niacinamide Pregnancy And Lactation Text: These medications are considered safe during pregnancy. Fluconazole Pregnancy And Lactation Text: This medication is Pregnancy Category C and it isn't know if it is safe during pregnancy. It is also excreted in breast milk. Xeljanz Counseling: I discussed with the patient the risks of Xeljanz therapy including increased risk of infection, liver issues, headache, diarrhea, or cold symptoms. Live vaccines should be avoided. They were instructed to call if they have any problems. Erivedge Counseling- I discussed with the patient the risks of Erivedge including but not limited to nausea, vomiting, diarrhea, constipation, weight loss, changes in the sense of taste, decreased appetite, muscle spasms, and hair loss.  The patient verbalized understanding of the proper use and possible adverse effects of Erivedge.  All of the patient's questions and concerns were addressed. Cimetidine Counseling:  I discussed with the patient the risks of Cimetidine including but not limited to gynecomastia, headache, diarrhea, nausea, drowsiness, arrhythmias, pancreatitis, skin rashes, psychosis, bone marrow suppression and kidney toxicity. Albendazole Pregnancy And Lactation Text: This medication is Pregnancy Category C and it isn't known if it is safe during pregnancy. It is also excreted in breast milk. Cyclophosphamide Counseling:  I discussed with the patient the risks of cyclophosphamide including but not limited to hair loss, hormonal abnormalities, decreased fertility, abdominal pain, diarrhea, nausea and vomiting, bone marrow suppression and infection. The patient understands that monitoring is required while taking this medication. Cyclophosphamide Pregnancy And Lactation Text: This medication is Pregnancy Category D and it isn't considered safe during pregnancy. This medication is excreted in breast milk. Ivermectin Counseling:  Patient instructed to take medication on an empty stomach with a full glass of water.  Patient informed of potential adverse effects including but not limited to nausea, diarrhea, dizziness, itching, and swelling of the extremities or lymph nodes.  The patient verbalized understanding of the proper use and possible adverse effects of ivermectin.  All of the patient's questions and concerns were addressed. Spironolactone Counseling: Patient advised regarding risks of diarrhea, abdominal pain, hyperkalemia, birth defects (for female patients), liver toxicity and renal toxicity. The patient may need blood work to monitor liver and kidney function and potassium levels while on therapy. The patient verbalized understanding of the proper use and possible adverse effects of spironolactone.  All of the patient's questions and concerns were addressed. Eucrisa Counseling: Patient may experience a mild burning sensation during topical application. Eucrisa is not approved in children less than 2 years of age. Nsaids Pregnancy And Lactation Text: These medications are considered safe up to 30 weeks gestation. It is excreted in breast milk. Clindamycin Counseling: I counseled the patient regarding use of clindamycin as an antibiotic for prophylactic and/or therapeutic purposes. Clindamycin is active against numerous classes of bacteria, including skin bacteria. Side effects may include nausea, diarrhea, gastrointestinal upset, rash, hives, yeast infections, and in rare cases, colitis. Humira Counseling:  I discussed with the patient the risks of adalimumab including but not limited to myelosuppression, immunosuppression, autoimmune hepatitis, demyelinating diseases, lymphoma, and serious infections.  The patient understands that monitoring is required including a PPD at baseline and must alert us or the primary physician if symptoms of infection or other concerning signs are noted. Gabapentin Counseling: I discussed with the patient the risks of gabapentin including but not limited to dizziness, somnolence, fatigue and ataxia. Benzoyl Peroxide Pregnancy And Lactation Text: This medication is Pregnancy Category C. It is unknown if benzoyl peroxide is excreted in breast milk. Erivedge Pregnancy And Lactation Text: This medication is Pregnancy Category X and is absolutely contraindicated during pregnancy. It is unknown if it is excreted in breast milk. Clindamycin Pregnancy And Lactation Text: This medication can be used in pregnancy if certain situations. Clindamycin is also present in breast milk. Quinolones Counseling:  I discussed with the patient the risks of fluoroquinolones including but not limited to GI upset, allergic reaction, drug rash, diarrhea, dizziness, photosensitivity, yeast infections, liver function test abnormalities, tendonitis/tendon rupture. Humira Pregnancy And Lactation Text: This medication is Pregnancy Category B and is considered safe during pregnancy. It is unknown if this medication is excreted in breast milk. Arava Counseling:  Patient counseled regarding adverse effects of Arava including but not limited to nausea, vomiting, abnormalities in liver function tests. Patients may develop mouth sores, rash, diarrhea, and abnormalities in blood counts. The patient understands that monitoring is required including LFTs and blood counts.  There is a rare possibility of scarring of the liver and lung problems that can occur when taking methotrexate. Persistent nausea, loss of appetite, pale stools, dark urine, cough, and shortness of breath should be reported immediately. Patient advised to discontinue Arava treatment and consult with a physician prior to attempting conception. The patient will have to undergo a treatment to eliminate Arava from the body prior to conception. Simponi Counseling:  I discussed with the patient the risks of golimumab including but not limited to myelosuppression, immunosuppression, autoimmune hepatitis, demyelinating diseases, lymphoma, and serious infections.  The patient understands that monitoring is required including a PPD at baseline and must alert us or the primary physician if symptoms of infection or other concerning signs are noted. Griseofulvin Counseling:  I discussed with the patient the risks of griseofulvin including but not limited to photosensitivity, cytopenia, liver damage, nausea/vomiting and severe allergy.  The patient understands that this medication is best absorbed when taken with a fatty meal (e.g., ice cream or french fries). Topical Clindamycin Counseling: Patient counseled that this medication may cause skin irritation or allergic reactions.  In the event of skin irritation, the patient was advised to reduce the amount of the drug applied or use it less frequently.   The patient verbalized understanding of the proper use and possible adverse effects of clindamycin.  All of the patient's questions and concerns were addressed. Xelkarleez Pregnancy And Lactation Text: This medication is Pregnancy Category D and is not considered safe during pregnancy.  The risk during breast feeding is also uncertain. Eucrisa Pregnancy And Lactation Text: This medication has not been assigned a Pregnancy Risk Category but animal studies failed to show danger with the topical medication. It is unknown if the medication is excreted in breast milk. Griseofulvin Pregnancy And Lactation Text: This medication is Pregnancy Category X and is known to cause serious birth defects. It is unknown if this medication is excreted in breast milk but breast feeding should be avoided. Odomzo Counseling- I discussed with the patient the risks of Odomzo including but not limited to nausea, vomiting, diarrhea, constipation, weight loss, changes in the sense of taste, decreased appetite, muscle spasms, and hair loss.  The patient verbalized understanding of the proper use and possible adverse effects of Odomzo.  All of the patient's questions and concerns were addressed. Cimetidine Pregnancy And Lactation Text: This medication is Pregnancy Category B and is considered safe during pregnancy. It is also excreted in breast milk and breast feeding isn't recommended. Xolair Counseling:  Patient informed of potential adverse effects including but not limited to fever, muscle aches, rash and allergic reactions.  The patient verbalized understanding of the proper use and possible adverse effects of Xolair.  All of the patient's questions and concerns were addressed. Gabapentin Pregnancy And Lactation Text: This medication is Pregnancy Category C and isn't considered safe during pregnancy. It is excreted in breast milk. Cyclosporine Counseling:  I discussed with the patient the risks of cyclosporine including but not limited to hypertension, gingival hyperplasia,myelosuppression, immunosuppression, liver damage, kidney damage, neurotoxicity, lymphoma, and serious infections. The patient understands that monitoring is required including baseline blood pressure, CBC, CMP, lipid panel and uric acid, and then 1-2 times monthly CMP and blood pressure. Clofazimine Counseling:  I discussed with the patient the risks of clofazimine including but not limited to skin and eye pigmentation, liver damage, nausea/vomiting, gastrointestinal bleeding and allergy. Acitretin Pregnancy And Lactation Text: This medication is Pregnancy Category X and should not be given to women who are pregnant or may become pregnant in the future. This medication is excreted in breast milk. Bexarotene Counseling:  I discussed with the patient the risks of bexarotene including but not limited to hair loss, dry lips/skin/eyes, liver abnormalities, hyperlipidemia, pancreatitis, depression/suicidal ideation, photosensitivity, drug rash/allergic reactions, hypothyroidism, anemia, leukopenia, infection, cataracts, and teratogenicity.  Patient understands that they will need regular blood tests to check lipid profile, liver function tests, white blood cell count, thyroid function tests and pregnancy test if applicable. 15 Doxycycline Counseling:  Patient counseled regarding possible photosensitivity and increased risk for sunburn.  Patient instructed to avoid sunlight, if possible.  When exposed to sunlight, patients should wear protective clothing, sunglasses, and sunscreen.  The patient was instructed to call the office immediately if the following severe adverse effects occur:  hearing changes, easy bruising/bleeding, severe headache, or vision changes.  The patient verbalized understanding of the proper use and possible adverse effects of doxycycline.  All of the patient's questions and concerns were addressed. Cimzia Pregnancy And Lactation Text: This medication crosses the placenta but can be considered safe in certain situations. Cimzia may be excreted in breast milk. Topical Clindamycin Pregnancy And Lactation Text: This medication is Pregnancy Category B and is considered safe during pregnancy. It is unknown if it is excreted in breast milk. Carac Counseling:  I discussed with the patient the risks of Carac including but not limited to erythema, scaling, itching, weeping, crusting, and pain. Ilumya Counseling: I discussed with the patient the risks of tildrakizumab including but not limited to immunosuppression, malignancy, posterior leukoencephalopathy syndrome, and serious infections.  The patient understands that monitoring is required including a PPD at baseline and must alert us or the primary physician if symptoms of infection or other concerning signs are noted. Protopic Counseling: Patient may experience a mild burning sensation during topical application. Protopic is not approved in children less than 2 years of age. There have been case reports of hematologic and skin malignancies in patients using topical calcineurin inhibitors although causality is questionable. Spironolactone Pregnancy And Lactation Text: This medication can cause feminization of the male fetus and should be avoided during pregnancy. The active metabolite is also found in breast milk. Rifampin Counseling: I discussed with the patient the risks of rifampin including but not limited to liver damage, kidney damage, red-orange body fluids, nausea/vomiting and severe allergy. Glycopyrrolate Counseling:  I discussed with the patient the risks of glycopyrrolate including but not limited to skin rash, drowsiness, dry mouth, difficulty urinating, and blurred vision. Itraconazole Counseling:  I discussed with the patient the risks of itraconazole including but not limited to liver damage, nausea/vomiting, neuropathy, and severe allergy.  The patient understands that this medication is best absorbed when taken with acidic beverages such as non-diet cola or ginger ale.  The patient understands that monitoring is required including baseline LFTs and repeat LFTs at intervals.  The patient understands that they are to contact us or the primary physician if concerning signs are noted. Stelara Counseling:  I discussed with the patient the risks of ustekinumab including but not limited to immunosuppression, malignancy, posterior leukoencephalopathy syndrome, and serious infections.  The patient understands that monitoring is required including a PPD at baseline and must alert us or the primary physician if symptoms of infection or other concerning signs are noted. Xolair Pregnancy And Lactation Text: This medication is Pregnancy Category B and is considered safe during pregnancy. This medication is excreted in breast milk. Doxepin Counseling:  Patient advised that the medication is sedating and not to drive a car after taking this medication. Patient informed of potential adverse effects including but not limited to dry mouth, urinary retention, and blurry vision.  The patient verbalized understanding of the proper use and possible adverse effects of doxepin.  All of the patient's questions and concerns were addressed. Topical Sulfur Applications Pregnancy And Lactation Text: This medication is Pregnancy Category C and has an unknown safety profile during pregnancy. It is unknown if this topical medication is excreted in breast milk. Cyclosporine Pregnancy And Lactation Text: This medication is Pregnancy Category C and it isn't know if it is safe during pregnancy. This medication is excreted in breast milk. Doxepin Pregnancy And Lactation Text: This medication is Pregnancy Category C and it isn't known if it is safe during pregnancy. It is also excreted in breast milk and breast feeding isn't recommended. Topical Sulfur Applications Counseling: Topical Sulfur Counseling: Patient counseled that this medication may cause skin irritation or allergic reactions.  In the event of skin irritation, the patient was advised to reduce the amount of the drug applied or use it less frequently.   The patient verbalized understanding of the proper use and possible adverse effects of topical sulfur application.  All of the patient's questions and concerns were addressed. Cimzia Counseling:  I discussed with the patient the risks of Cimzia including but not limited to immunosuppression, allergic reactions and infections.  The patient understands that monitoring is required including a PPD at baseline and must alert us or the primary physician if symptoms of infection or other concerning signs are noted. Methotrexate Counseling:  Patient counseled regarding adverse effects of methotrexate including but not limited to nausea, vomiting, abnormalities in liver function tests. Patients may develop mouth sores, rash, diarrhea, and abnormalities in blood counts. The patient understands that monitoring is required including LFT's and blood counts.  There is a rare possibility of scarring of the liver and lung problems that can occur when taking methotrexate. Persistent nausea, loss of appetite, pale stools, dark urine, cough, and shortness of breath should be reported immediately. Patient advised to discontinue methotrexate treatment at least three months before attempting to become pregnant.  I discussed the need for folate supplements while taking methotrexate.  These supplements can decrease side effects during methotrexate treatment. The patient verbalized understanding of the proper use and possible adverse effects of methotrexate.  All of the patient's questions and concerns were addressed. Azithromycin Pregnancy And Lactation Text: This medication is considered safe during pregnancy and is also secreted in breast milk. Protopic Pregnancy And Lactation Text: This medication is Pregnancy Category C. It is unknown if this medication is excreted in breast milk when applied topically. SSKI Counseling:  I discussed with the patient the risks of SSKI including but not limited to thyroid abnormalities, metallic taste, GI upset, fever, headache, acne, arthralgias, paraesthesias, lymphadenopathy, easy bleeding, arrhythmias, and allergic reaction. Bexarotene Pregnancy And Lactation Text: This medication is Pregnancy Category X and should not be given to women who are pregnant or may become pregnant. This medication should not be used if you are breast feeding. Cosentyx Counseling:  I discussed with the patient the risks of Cosentyx including but not limited to worsening of Crohn's disease, immunosuppression, allergic reactions and infections.  The patient understands that monitoring is required including a PPD at baseline and must alert us or the primary physician if symptoms of infection or other concerning signs are noted. Hydroquinone Counseling:  Patient advised that medication may result in skin irritation, lightening (hypopigmentation), dryness, and burning.  In the event of skin irritation, the patient was advised to reduce the amount of the drug applied or use it less frequently.  Rarely, spots that are treated with hydroquinone can become darker (pseudoochronosis).  Should this occur, patient instructed to stop medication and call the office. The patient verbalized understanding of the proper use and possible adverse effects of hydroquinone.  All of the patient's questions and concerns were addressed. Carac Pregnancy And Lactation Text: This medication is Pregnancy Category X and contraindicated in pregnancy and in women who may become pregnant. It is unknown if this medication is excreted in breast milk. Doxycycline Pregnancy And Lactation Text: This medication is Pregnancy Category D and not consider safe during pregnancy. It is also excreted in breast milk but is considered safe for shorter treatment courses. Sski Pregnancy And Lactation Text: This medication is Pregnancy Category D and isn't considered safe during pregnancy. It is excreted in breast milk. Erythromycin Counseling:  I discussed with the patient the risks of erythromycin including but not limited to GI upset, allergic reaction, drug rash, diarrhea, increase in liver enzymes, and yeast infections. 5-Fu Counseling: 5-Fluorouracil Counseling:  I discussed with the patient the risks of 5-fluorouracil including but not limited to erythema, scaling, itching, weeping, crusting, and pain. Infliximab Counseling:  I discussed with the patient the risks of infliximab including but not limited to myelosuppression, immunosuppression, autoimmune hepatitis, demyelinating diseases, lymphoma, and serious infections.  The patient understands that monitoring is required including a PPD at baseline and must alert us or the primary physician if symptoms of infection or other concerning signs are noted. Otezla Counseling: The side effects of Otezla were discussed with the patient, including but not limited to worsening or new depression, weight loss, diarrhea, nausea, upper respiratory tract infection, and headache. Patient instructed to call the office should any adverse effect occur.  The patient verbalized understanding of the proper use and possible adverse effects of Otezla.  All the patient's questions and concerns were addressed. Rifampin Pregnancy And Lactation Text: This medication is Pregnancy Category C and it isn't know if it is safe during pregnancy. It is also excreted in breast milk and should not be used if you are breast feeding. Wartpeel Counseling:  I discussed with the patient the risks of Wartpeel including but not limited to erythema, scaling, itching, weeping, crusting, and pain. Glycopyrrolate Pregnancy And Lactation Text: This medication is Pregnancy Category B and is considered safe during pregnancy. It is unknown if it is excreted breast milk. Solaraze Pregnancy And Lactation Text: This medication is Pregnancy Category B and is considered safe. There is some data to suggest avoiding during the third trimester. It is unknown if this medication is excreted in breast milk. Azathioprine Counseling:  I discussed with the patient the risks of azathioprine including but not limited to myelosuppression, immunosuppression, hepatotoxicity, lymphoma, and infections.  The patient understands that monitoring is required including baseline LFTs, Creatinine, possible TPMP genotyping and weekly CBCs for the first month and then every 2 weeks thereafter.  The patient verbalized understanding of the proper use and possible adverse effects of azathioprine.  All of the patient's questions and concerns were addressed. Colchicine Counseling:  Patient counseled regarding adverse effects including but not limited to stomach upset (nausea, vomiting, stomach pain, or diarrhea).  Patient instructed to limit alcohol consumption while taking this medication.  Colchicine may reduce blood counts especially with prolonged use.  The patient understands that monitoring of kidney function and blood counts may be required, especially at baseline. The patient verbalized understanding of the proper use and possible adverse effects of colchicine.  All of the patient's questions and concerns were addressed. Solaraze Counseling:  I discussed with the patient the risks of Solaraze including but not limited to erythema, scaling, itching, weeping, crusting, and pain. Ketoconazole Counseling:   Patient counseled regarding improving absorption with orange juice.  Adverse effects include but are not limited to breast enlargement, headache, diarrhea, nausea, upset stomach, liver function test abnormalities, taste disturbance, and stomach pain.  There is a rare possibility of liver failure that can occur when taking ketoconazole. The patient understands that monitoring of LFTs may be required, especially at baseline. The patient verbalized understanding of the proper use and possible adverse effects of ketoconazole.  All of the patient's questions and concerns were addressed. Hydroxyzine Counseling: Patient advised that the medication is sedating and not to drive a car after taking this medication.  Patient informed of potential adverse effects including but not limited to dry mouth, urinary retention, and blurry vision.  The patient verbalized understanding of the proper use and possible adverse effects of hydroxyzine.  All of the patient's questions and concerns were addressed. Methotrexate Pregnancy And Lactation Text: This medication is Pregnancy Category X and is known to cause fetal harm. This medication is excreted in breast milk. Azithromycin Counseling:  I discussed with the patient the risks of azithromycin including but not limited to GI upset, allergic reaction, drug rash, diarrhea, and yeast infections. Isotretinoin Counseling: Patient should get monthly blood tests, not donate blood, not drive at night if vision affected, not share medication, and not undergo elective surgery for 6 months after tx completed. Side effects reviewed, pt to contact office should one occur. Bactrim Counseling:  I discussed with the patient the risks of sulfa antibiotics including but not limited to GI upset, allergic reaction, drug rash, diarrhea, dizziness, photosensitivity, and yeast infections.  Rarely, more serious reactions can occur including but not limited to aplastic anemia, agranulocytosis, methemoglobinemia, blood dyscrasias, liver or kidney failure, lung infiltrates or desquamative/blistering drug rashes. Isotretinoin Pregnancy And Lactation Text: This medication is Pregnancy Category X and is considered extremely dangerous during pregnancy. It is unknown if it is excreted in breast milk. Hydroxychloroquine Counseling:  I discussed with the patient that a baseline ophthalmologic exam is needed at the start of therapy and every year thereafter while on therapy. A CBC may also be warranted for monitoring.  The side effects of this medication were discussed with the patient, including but not limited to agranulocytosis, aplastic anemia, seizures, rashes, retinopathy, and liver toxicity. Patient instructed to call the office should any adverse effect occur.  The patient verbalized understanding of the proper use and possible adverse effects of Plaquenil.  All the patient's questions and concerns were addressed. Dupixent Counseling: I discussed with the patient the risks of dupilumab including but not limited to eye infection and irritation, cold sores, injection site reactions, worsening of asthma, allergic reactions and increased risk of parasitic infection.  Live vaccines should be avoided while taking dupilumab. Dupilumab will also interact with certain medications such as warfarin and cyclosporine. The patient understands that monitoring is required and they must alert us or the primary physician if symptoms of infection or other concerning signs are noted. Erythromycin Pregnancy And Lactation Text: This medication is Pregnancy Category B and is considered safe during pregnancy. It is also excreted in breast milk. Tetracycline Counseling: Patient counseled regarding possible photosensitivity and increased risk for sunburn.  Patient instructed to avoid sunlight, if possible.  When exposed to sunlight, patients should wear protective clothing, sunglasses, and sunscreen.  The patient was instructed to call the office immediately if the following severe adverse effects occur:  hearing changes, easy bruising/bleeding, severe headache, or vision changes.  The patient verbalized understanding of the proper use and possible adverse effects of tetracycline.  All of the patient's questions and concerns were addressed. Patient understands to avoid pregnancy while on therapy due to potential birth defects. Taltz Counseling: I discussed with the patient the risks of ixekizumab including but not limited to immunosuppression, serious infections, worsening of inflammatory bowel disease and drug reactions.  The patient understands that monitoring is required including a PPD at baseline and must alert us or the primary physician if symptoms of infection or other concerning signs are noted. Rituxan Counseling:  I discussed with the patient the risks of Rituxan infusions. Side effects can include infusion reactions, severe drug rashes including mucocutaneous reactions, reactivation of latent hepatitis and other infections and rarely progressive multifocal leukoencephalopathy.  All of the patient's questions and concerns were addressed. Imiquimod Counseling:  I discussed with the patient the risks of imiquimod including but not limited to erythema, scaling, itching, weeping, crusting, and pain.  Patient understands that the inflammatory response to imiquimod is variable from person to person and was educated regarded proper titration schedule.  If flu-like symptoms develop, patient knows to discontinue the medication and contact us. Azathioprine Pregnancy And Lactation Text: This medication is Pregnancy Category D and isn't considered safe during pregnancy. It is unknown if this medication is excreted in breast milk. Thalidomide Counseling: I discussed with the patient the risks of thalidomide including but not limited to birth defects, anxiety, weakness, chest pain, dizziness, cough and severe allergy. Otezla Pregnancy And Lactation Text: This medication is Pregnancy Category C and it isn't known if it is safe during pregnancy. It is unknown if it is excreted in breast milk. Prednisone Counseling:  I discussed with the patient the risks of prolonged use of prednisone including but not limited to weight gain, insomnia, osteoporosis, mood changes, diabetes, susceptibility to infection, glaucoma and high blood pressure.  In cases where prednisone use is prolonged, patients should be monitored with blood pressure checks, serum glucose levels and an eye exam.  Additionally, the patient may need to be placed on GI prophylaxis, PCP prophylaxis, and calcium and vitamin D supplementation and/or a bisphosphonate.  The patient verbalized understanding of the proper use and the possible adverse effects of prednisone.  All of the patient's questions and concerns were addressed. Metronidazole Counseling:  I discussed with the patient the risks of metronidazole including but not limited to seizures, nausea/vomiting, a metallic taste in the mouth, nausea/vomiting and severe allergy. Drysol Counseling:  I discussed with the patient the risks of drysol/aluminum chloride including but not limited to skin rash, itching, irritation, burning. Dapsone Counseling: I discussed with the patient the risks of dapsone including but not limited to hemolytic anemia, agranulocytosis, rashes, methemoglobinemia, kidney failure, peripheral neuropathy, headaches, GI upset, and liver toxicity.  Patients who start dapsone require monitoring including baseline LFTs and weekly CBCs for the first month, then every month thereafter.  The patient verbalized understanding of the proper use and possible adverse effects of dapsone.  All of the patient's questions and concerns were addressed. Bactrim Pregnancy And Lactation Text: This medication is Pregnancy Category D and is known to cause fetal risk.  It is also excreted in breast milk. Ketoconazole Pregnancy And Lactation Text: This medication is Pregnancy Category C and it isn't know if it is safe during pregnancy. It is also excreted in breast milk and breast feeding isn't recommended. Zyclara Counseling:  I discussed with the patient the risks of imiquimod including but not limited to erythema, scaling, itching, weeping, crusting, and pain.  Patient understands that the inflammatory response to imiquimod is variable from person to person and was educated regarded proper titration schedule.  If flu-like symptoms develop, patient knows to discontinue the medication and contact us. Dupixent Pregnancy And Lactation Text: This medication likely crosses the placenta but the risk for the fetus is uncertain. This medication is excreted in breast milk. Hydroxyzine Pregnancy And Lactation Text: This medication is not safe during pregnancy and should not be taken. It is also excreted in breast milk and breast feeding isn't recommended. Valtrex Counseling: I discussed with the patient the risks of valacyclovir including but not limited to kidney damage, nausea, vomiting and severe allergy.  The patient understands that if the infection seems to be worsening or is not improving, they are to call. Enbrel Counseling:  I discussed with the patient the risks of etanercept including but not limited to myelosuppression, immunosuppression, autoimmune hepatitis, demyelinating diseases, lymphoma, and infections.  The patient understands that monitoring is required including a PPD at baseline and must alert us or the primary physician if symptoms of infection or other concerning signs are noted. Topical Retinoid counseling:  Patient advised to apply a pea-sized amount only at bedtime and wait 30 minutes after washing their face before applying.  If too drying, patient may add a non-comedogenic moisturizer. The patient verbalized understanding of the proper use and possible adverse effects of retinoids.  All of the patient's questions and concerns were addressed. Rituxan Pregnancy And Lactation Text: This medication is Pregnancy Category C and it isn't know if it is safe during pregnancy. It is unknown if this medication is excreted in breast milk but similar antibodies are known to be excreted. Tremfya Counseling: I discussed with the patient the risks of guselkumab including but not limited to immunosuppression, serious infections, worsening of inflammatory bowel disease and drug reactions.  The patient understands that monitoring is required including a PPD at baseline and must alert us or the primary physician if symptoms of infection or other concerning signs are noted. Oxybutynin Counseling:  I discussed with the patient the risks of oxybutynin including but not limited to skin rash, drowsiness, dry mouth, difficulty urinating, and blurred vision. High Dose Vitamin A Counseling: Side effects reviewed, pt to contact office should one occur. Hydroxychloroquine Pregnancy And Lactation Text: This medication has been shown to cause fetal harm but it isn't assigned a Pregnancy Risk Category. There are small amounts excreted in breast milk. Cellcept Counseling:  I discussed with the patient the risks of mycophenolate mofetil including but not limited to infection/immunosuppression, GI upset, hypokalemia, hypercholesterolemia, bone marrow suppression, lymphoproliferative disorders, malignancy, GI ulceration/bleed/perforation, colitis, interstitial lung disease, kidney failure, progressive multifocal leukoencephalopathy, and birth defects.  The patient understands that monitoring is required including a baseline creatinine and regular CBC testing. In addition, patient must alert us immediately if symptoms of infection or other concerning signs are noted. Cephalexin Counseling: I counseled the patient regarding use of cephalexin as an antibiotic for prophylactic and/or therapeutic purposes. Cephalexin (commonly prescribed under brand name Keflex) is a cephalosporin antibiotic which is active against numerous classes of bacteria, including most skin bacteria. Side effects may include nausea, diarrhea, gastrointestinal upset, rash, hives, yeast infections, and in rare cases, hepatitis, kidney disease, seizures, fever, confusion, neurologic symptoms, and others. Patients with severe allergies to penicillin medications are cautioned that there is about a 10% incidence of cross-reactivity with cephalosporins. When possible, patients with penicillin allergies should use alternatives to cephalosporins for antibiotic therapy. High Dose Vitamin A Pregnancy And Lactation Text: High dose vitamin A therapy is contraindicated during pregnancy and breast feeding. Metronidazole Pregnancy And Lactation Text: This medication is Pregnancy Category B and considered safe during pregnancy.  It is also excreted in breast milk. Drysol Pregnancy And Lactation Text: This medication is considered safe during pregnancy and breast feeding. Fluconazole Counseling:  Patient counseled regarding adverse effects of fluconazole including but not limited to headache, diarrhea, nausea, upset stomach, liver function test abnormalities, taste disturbance, and stomach pain.  There is a rare possibility of liver failure that can occur when taking fluconazole.  The patient understands that monitoring of LFTs and kidney function test may be required, especially at baseline. The patient verbalized understanding of the proper use and possible adverse effects of fluconazole.  All of the patient's questions and concerns were addressed. Tazorac Counseling:  Patient advised that medication is irritating and drying.  Patient may need to apply sparingly and wash off after an hour before eventually leaving it on overnight.  The patient verbalized understanding of the proper use and possible adverse effects of tazorac.  All of the patient's questions and concerns were addressed. Detail Level: Zone Valtrex Pregnancy And Lactation Text: this medication is Pregnancy Category B and is considered safe during pregnancy. This medication is not directly found in breast milk but it's metabolite acyclovir is present. Terbinafine Counseling: Patient counseling regarding adverse effects of terbinafine including but not limited to headache, diarrhea, rash, upset stomach, liver function test abnormalities, itching, taste/smell disturbance, nausea, abdominal pain, and flatulence.  There is a rare possibility of liver failure that can occur when taking terbinafine.  The patient understands that a baseline LFT and kidney function test may be required. The patient verbalized understanding of the proper use and possible adverse effects of terbinafine.  All of the patient's questions and concerns were addressed. Birth Control Pills Counseling: Birth Control Pill Counseling: I discussed with the patient the potential side effects of OCPs including but not limited to increased risk of stroke, heart attack, thrombophlebitis, deep venous thrombosis, hepatic adenomas, breast changes, GI upset, headaches, and depression.  The patient verbalized understanding of the proper use and possible adverse effects of OCPs. All of the patient's questions and concerns were addressed. Minoxidil Counseling: Minoxidil is a topical medication which can increase blood flow where it is applied. It is uncertain how this medication increases hair growth. Side effects are uncommon and include stinging and allergic reactions. Niacinamide Counseling: I recommended taking niacin or niacinamide, also know as vitamin B3, twice daily. Recent evidence suggests that taking vitamin B3 (500 mg twice daily) can reduce the risk of actinic keratoses and non-melanoma skin cancers. Side effects of vitamin B3 include flushing and headache. Siliq Counseling:  I discussed with the patient the risks of Siliq including but not limited to new or worsening depression, suicidal thoughts and behavior, immunosuppression, malignancy, posterior leukoencephalopathy syndrome, and serious infections.  The patient understands that monitoring is required including a PPD at baseline and must alert us or the primary physician if symptoms of infection or other concerning signs are noted. There is also a special program designed to monitor depression which is required with Siliq. Albendazole Counseling:  I discussed with the patient the risks of albendazole including but not limited to cytopenia, kidney damage, nausea/vomiting and severe allergy.  The patient understands that this medication is being used in an off-label manner. Acitretin Counseling:  I discussed with the patient the risks of acitretin including but not limited to hair loss, dry lips/skin/eyes, liver damage, hyperlipidemia, depression/suicidal ideation, photosensitivity.  Serious rare side effects can include but are not limited to pancreatitis, pseudotumor cerebri, bony changes, clot formation/stroke/heart attack.  Patient understands that alcohol is contraindicated since it can result in liver toxicity and significantly prolong the elimination of the drug by many years. Dapsone Pregnancy And Lactation Text: This medication is Pregnancy Category C and is not considered safe during pregnancy or breast feeding.

## 2023-06-02 NOTE — ED ADULT TRIAGE NOTE - CHIEF COMPLAINT QUOTE
pt c/o left side chest pain that radiates to back  started about 2hours ago pt took 324 ASA @1550, resp even and unlabored no distress noted [[email or web address]] : Rash on right hip area - ? tick bite - for a few days.

## 2024-06-28 ENCOUNTER — EMERGENCY (EMERGENCY)
Facility: HOSPITAL | Age: 79
LOS: 1 days | Discharge: HOME | End: 2024-06-28
Attending: STUDENT IN AN ORGANIZED HEALTH CARE EDUCATION/TRAINING PROGRAM
Payer: MEDICARE

## 2024-06-28 VITALS
OXYGEN SATURATION: 97 % | DIASTOLIC BLOOD PRESSURE: 75 MMHG | HEART RATE: 89 BPM | SYSTOLIC BLOOD PRESSURE: 188 MMHG | RESPIRATION RATE: 20 BRPM | TEMPERATURE: 98 F | WEIGHT: 154.32 LBS

## 2024-06-28 LAB
ALBUMIN SERPL ELPH-MCNC: 3.8 G/DL — SIGNIFICANT CHANGE UP (ref 3.3–5.2)
ALP SERPL-CCNC: 166 U/L — HIGH (ref 40–120)
ALT FLD-CCNC: 27 U/L — SIGNIFICANT CHANGE UP
ANION GAP SERPL CALC-SCNC: 13 MMOL/L — SIGNIFICANT CHANGE UP (ref 5–17)
AST SERPL-CCNC: 17 U/L — SIGNIFICANT CHANGE UP
BASOPHILS # BLD AUTO: 0.08 K/UL — SIGNIFICANT CHANGE UP (ref 0–0.2)
BASOPHILS NFR BLD AUTO: 1 % — SIGNIFICANT CHANGE UP (ref 0–2)
BILIRUB SERPL-MCNC: 0.3 MG/DL — LOW (ref 0.4–2)
BUN SERPL-MCNC: 21.6 MG/DL — HIGH (ref 8–20)
CALCIUM SERPL-MCNC: 9.2 MG/DL — SIGNIFICANT CHANGE UP (ref 8.4–10.5)
CHLORIDE SERPL-SCNC: 98 MMOL/L — SIGNIFICANT CHANGE UP (ref 96–108)
CO2 SERPL-SCNC: 23 MMOL/L — SIGNIFICANT CHANGE UP (ref 22–29)
CREAT SERPL-MCNC: 1.02 MG/DL — SIGNIFICANT CHANGE UP (ref 0.5–1.3)
EGFR: 74 ML/MIN/1.73M2 — SIGNIFICANT CHANGE UP
EOSINOPHIL # BLD AUTO: 0.39 K/UL — SIGNIFICANT CHANGE UP (ref 0–0.5)
EOSINOPHIL NFR BLD AUTO: 4.8 % — SIGNIFICANT CHANGE UP (ref 0–6)
GLUCOSE SERPL-MCNC: 308 MG/DL — HIGH (ref 70–99)
HCT VFR BLD CALC: 40.2 % — SIGNIFICANT CHANGE UP (ref 39–50)
HGB BLD-MCNC: 13.8 G/DL — SIGNIFICANT CHANGE UP (ref 13–17)
IMM GRANULOCYTES NFR BLD AUTO: 0.4 % — SIGNIFICANT CHANGE UP (ref 0–0.9)
LYMPHOCYTES # BLD AUTO: 1.44 K/UL — SIGNIFICANT CHANGE UP (ref 1–3.3)
LYMPHOCYTES # BLD AUTO: 17.7 % — SIGNIFICANT CHANGE UP (ref 13–44)
MAGNESIUM SERPL-MCNC: 2.1 MG/DL — SIGNIFICANT CHANGE UP (ref 1.8–2.6)
MCHC RBC-ENTMCNC: 28.6 PG — SIGNIFICANT CHANGE UP (ref 27–34)
MCHC RBC-ENTMCNC: 34.3 GM/DL — SIGNIFICANT CHANGE UP (ref 32–36)
MCV RBC AUTO: 83.2 FL — SIGNIFICANT CHANGE UP (ref 80–100)
MONOCYTES # BLD AUTO: 0.95 K/UL — HIGH (ref 0–0.9)
MONOCYTES NFR BLD AUTO: 11.7 % — SIGNIFICANT CHANGE UP (ref 2–14)
NEUTROPHILS # BLD AUTO: 5.25 K/UL — SIGNIFICANT CHANGE UP (ref 1.8–7.4)
NEUTROPHILS NFR BLD AUTO: 64.4 % — SIGNIFICANT CHANGE UP (ref 43–77)
PLATELET # BLD AUTO: 198 K/UL — SIGNIFICANT CHANGE UP (ref 150–400)
POTASSIUM SERPL-MCNC: 4.2 MMOL/L — SIGNIFICANT CHANGE UP (ref 3.5–5.3)
POTASSIUM SERPL-SCNC: 4.2 MMOL/L — SIGNIFICANT CHANGE UP (ref 3.5–5.3)
PROT SERPL-MCNC: 6.9 G/DL — SIGNIFICANT CHANGE UP (ref 6.6–8.7)
RBC # BLD: 4.83 M/UL — SIGNIFICANT CHANGE UP (ref 4.2–5.8)
RBC # FLD: 13.9 % — SIGNIFICANT CHANGE UP (ref 10.3–14.5)
SODIUM SERPL-SCNC: 134 MMOL/L — LOW (ref 135–145)
WBC # BLD: 8.14 K/UL — SIGNIFICANT CHANGE UP (ref 3.8–10.5)
WBC # FLD AUTO: 8.14 K/UL — SIGNIFICANT CHANGE UP (ref 3.8–10.5)

## 2024-06-28 PROCEDURE — 73030 X-RAY EXAM OF SHOULDER: CPT | Mod: 26,LT

## 2024-06-28 PROCEDURE — 99285 EMERGENCY DEPT VISIT HI MDM: CPT

## 2024-06-28 PROCEDURE — 93010 ELECTROCARDIOGRAM REPORT: CPT

## 2024-06-28 PROCEDURE — 71046 X-RAY EXAM CHEST 2 VIEWS: CPT | Mod: 26

## 2024-06-28 RX ORDER — ASPIRIN 325 MG/1
162 TABLET, FILM COATED ORAL ONCE
Refills: 0 | Status: COMPLETED | OUTPATIENT
Start: 2024-06-28 | End: 2024-06-28

## 2024-06-28 RX ADMIN — ASPIRIN 162 MILLIGRAM(S): 325 TABLET, FILM COATED ORAL at 22:22

## 2024-06-29 VITALS — DIASTOLIC BLOOD PRESSURE: 89 MMHG | SYSTOLIC BLOOD PRESSURE: 159 MMHG

## 2024-06-29 LAB
NT-PROBNP SERPL-SCNC: 190 PG/ML — SIGNIFICANT CHANGE UP (ref 0–300)
TROPONIN T, HIGH SENSITIVITY RESULT: 13 NG/L — SIGNIFICANT CHANGE UP (ref 0–51)
TROPONIN T, HIGH SENSITIVITY RESULT: 13 NG/L — SIGNIFICANT CHANGE UP (ref 0–51)

## 2024-06-29 PROCEDURE — 80053 COMPREHEN METABOLIC PANEL: CPT

## 2024-06-29 PROCEDURE — 99285 EMERGENCY DEPT VISIT HI MDM: CPT | Mod: 25

## 2024-06-29 PROCEDURE — 36415 COLL VENOUS BLD VENIPUNCTURE: CPT

## 2024-06-29 PROCEDURE — 83735 ASSAY OF MAGNESIUM: CPT

## 2024-06-29 PROCEDURE — 73030 X-RAY EXAM OF SHOULDER: CPT

## 2024-06-29 PROCEDURE — 93005 ELECTROCARDIOGRAM TRACING: CPT

## 2024-06-29 PROCEDURE — 83880 ASSAY OF NATRIURETIC PEPTIDE: CPT

## 2024-06-29 PROCEDURE — 84484 ASSAY OF TROPONIN QUANT: CPT

## 2024-06-29 PROCEDURE — 71046 X-RAY EXAM CHEST 2 VIEWS: CPT

## 2024-06-29 PROCEDURE — 85025 COMPLETE CBC W/AUTO DIFF WBC: CPT
